# Patient Record
Sex: MALE | Race: WHITE | NOT HISPANIC OR LATINO | Employment: OTHER
[De-identification: names, ages, dates, MRNs, and addresses within clinical notes are randomized per-mention and may not be internally consistent; named-entity substitution may affect disease eponyms.]

---

## 2020-05-04 DIAGNOSIS — Z79.899 ENCOUNTER FOR LONG-TERM (CURRENT) USE OF OTHER MEDICATIONS: Primary | ICD-10-CM

## 2020-05-04 DIAGNOSIS — T38.0X5A IMMUNOSUPPRESSION DUE TO CHRONIC STEROID USE (H): ICD-10-CM

## 2020-05-04 DIAGNOSIS — D84.821 IMMUNOSUPPRESSION DUE TO CHRONIC STEROID USE (H): ICD-10-CM

## 2020-05-04 DIAGNOSIS — Z79.52 IMMUNOSUPPRESSION DUE TO CHRONIC STEROID USE (H): ICD-10-CM

## 2020-05-04 DIAGNOSIS — Z79.52 LONG TERM CURRENT USE OF SYSTEMIC STEROIDS: ICD-10-CM

## 2020-05-04 DIAGNOSIS — Z94.0 KIDNEY TRANSPLANTED: ICD-10-CM

## 2020-05-05 DIAGNOSIS — Z79.899 ENCOUNTER FOR LONG-TERM (CURRENT) USE OF OTHER MEDICATIONS: ICD-10-CM

## 2020-05-05 DIAGNOSIS — D84.821 IMMUNOSUPPRESSION DUE TO CHRONIC STEROID USE (H): ICD-10-CM

## 2020-05-05 DIAGNOSIS — Z79.52 IMMUNOSUPPRESSION DUE TO CHRONIC STEROID USE (H): ICD-10-CM

## 2020-05-05 DIAGNOSIS — Z94.0 KIDNEY TRANSPLANTED: ICD-10-CM

## 2020-05-05 DIAGNOSIS — T38.0X5A IMMUNOSUPPRESSION DUE TO CHRONIC STEROID USE (H): ICD-10-CM

## 2020-05-05 LAB
ANION GAP SERPL CALCULATED.3IONS-SCNC: 7 MMOL/L (ref 3–14)
BASOPHILS # BLD AUTO: 0 10E9/L (ref 0–0.2)
BASOPHILS NFR BLD AUTO: 0.2 %
BUN SERPL-MCNC: 24 MG/DL (ref 7–30)
CALCIUM SERPL-MCNC: 9.1 MG/DL (ref 8.5–10.1)
CHLORIDE SERPL-SCNC: 104 MMOL/L (ref 94–109)
CO2 SERPL-SCNC: 25 MMOL/L (ref 20–32)
CREAT SERPL-MCNC: 1.59 MG/DL (ref 0.66–1.25)
DIFFERENTIAL METHOD BLD: NORMAL
EOSINOPHIL # BLD AUTO: 0.1 10E9/L (ref 0–0.7)
EOSINOPHIL NFR BLD AUTO: 1.6 %
ERYTHROCYTE [DISTWIDTH] IN BLOOD BY AUTOMATED COUNT: 14 % (ref 10–15)
GFR SERPL CREATININE-BSD FRML MDRD: 46 ML/MIN/{1.73_M2}
GLUCOSE SERPL-MCNC: 112 MG/DL (ref 70–99)
HCT VFR BLD AUTO: 42.1 % (ref 40–53)
HGB BLD-MCNC: 13.5 G/DL (ref 13.3–17.7)
LYMPHOCYTES # BLD AUTO: 2.1 10E9/L (ref 0.8–5.3)
LYMPHOCYTES NFR BLD AUTO: 24.9 %
MCH RBC QN AUTO: 29.7 PG (ref 26.5–33)
MCHC RBC AUTO-ENTMCNC: 32.1 G/DL (ref 31.5–36.5)
MCV RBC AUTO: 93 FL (ref 78–100)
MONOCYTES # BLD AUTO: 1 10E9/L (ref 0–1.3)
MONOCYTES NFR BLD AUTO: 12.3 %
NEUTROPHILS # BLD AUTO: 5 10E9/L (ref 1.6–8.3)
NEUTROPHILS NFR BLD AUTO: 61 %
PLATELET # BLD AUTO: 195 10E9/L (ref 150–450)
POTASSIUM SERPL-SCNC: 4.6 MMOL/L (ref 3.4–5.3)
RBC # BLD AUTO: 4.55 10E12/L (ref 4.4–5.9)
SODIUM SERPL-SCNC: 136 MMOL/L (ref 133–144)
WBC # BLD AUTO: 8.3 10E9/L (ref 4–11)

## 2020-05-05 PROCEDURE — 85025 COMPLETE CBC W/AUTO DIFF WBC: CPT | Performed by: INTERNAL MEDICINE

## 2020-05-05 PROCEDURE — 36415 COLL VENOUS BLD VENIPUNCTURE: CPT | Performed by: INTERNAL MEDICINE

## 2020-05-05 PROCEDURE — 80048 BASIC METABOLIC PNL TOTAL CA: CPT | Performed by: INTERNAL MEDICINE

## 2020-06-19 DIAGNOSIS — T38.0X5A IMMUNOSUPPRESSION DUE TO CHRONIC STEROID USE (H): ICD-10-CM

## 2020-06-19 DIAGNOSIS — D84.821 IMMUNOSUPPRESSION DUE TO CHRONIC STEROID USE (H): ICD-10-CM

## 2020-06-19 DIAGNOSIS — Z79.899 ENCOUNTER FOR LONG-TERM (CURRENT) USE OF OTHER MEDICATIONS: ICD-10-CM

## 2020-06-19 DIAGNOSIS — Z94.0 KIDNEY TRANSPLANTED: ICD-10-CM

## 2020-06-19 DIAGNOSIS — Z79.52 IMMUNOSUPPRESSION DUE TO CHRONIC STEROID USE (H): ICD-10-CM

## 2020-06-19 LAB
ANION GAP SERPL CALCULATED.3IONS-SCNC: 7 MMOL/L (ref 3–14)
BASOPHILS # BLD AUTO: 0 10E9/L (ref 0–0.2)
BASOPHILS NFR BLD AUTO: 0.3 %
BUN SERPL-MCNC: 30 MG/DL (ref 7–30)
CALCIUM SERPL-MCNC: 8.8 MG/DL (ref 8.5–10.1)
CHLORIDE SERPL-SCNC: 107 MMOL/L (ref 94–109)
CO2 SERPL-SCNC: 22 MMOL/L (ref 20–32)
CREAT SERPL-MCNC: 1.85 MG/DL (ref 0.66–1.25)
DIFFERENTIAL METHOD BLD: ABNORMAL
EOSINOPHIL # BLD AUTO: 0.1 10E9/L (ref 0–0.7)
EOSINOPHIL NFR BLD AUTO: 1.2 %
ERYTHROCYTE [DISTWIDTH] IN BLOOD BY AUTOMATED COUNT: 13.6 % (ref 10–15)
GFR SERPL CREATININE-BSD FRML MDRD: 38 ML/MIN/{1.73_M2}
GLUCOSE SERPL-MCNC: 112 MG/DL (ref 70–99)
HCT VFR BLD AUTO: 41.8 % (ref 40–53)
HGB BLD-MCNC: 13.2 G/DL (ref 13.3–17.7)
LYMPHOCYTES # BLD AUTO: 1.4 10E9/L (ref 0.8–5.3)
LYMPHOCYTES NFR BLD AUTO: 19.2 %
MCH RBC QN AUTO: 30.1 PG (ref 26.5–33)
MCHC RBC AUTO-ENTMCNC: 31.6 G/DL (ref 31.5–36.5)
MCV RBC AUTO: 95 FL (ref 78–100)
MONOCYTES # BLD AUTO: 0.7 10E9/L (ref 0–1.3)
MONOCYTES NFR BLD AUTO: 9.9 %
NEUTROPHILS # BLD AUTO: 5.1 10E9/L (ref 1.6–8.3)
NEUTROPHILS NFR BLD AUTO: 69.4 %
PLATELET # BLD AUTO: 184 10E9/L (ref 150–450)
POTASSIUM SERPL-SCNC: 5.1 MMOL/L (ref 3.4–5.3)
RBC # BLD AUTO: 4.39 10E12/L (ref 4.4–5.9)
SODIUM SERPL-SCNC: 136 MMOL/L (ref 133–144)
WBC # BLD AUTO: 7.3 10E9/L (ref 4–11)

## 2020-06-19 PROCEDURE — 36415 COLL VENOUS BLD VENIPUNCTURE: CPT | Performed by: INTERNAL MEDICINE

## 2020-06-19 PROCEDURE — 80197 ASSAY OF TACROLIMUS: CPT | Performed by: INTERNAL MEDICINE

## 2020-06-19 PROCEDURE — 85025 COMPLETE CBC W/AUTO DIFF WBC: CPT | Performed by: INTERNAL MEDICINE

## 2020-06-19 PROCEDURE — 80048 BASIC METABOLIC PNL TOTAL CA: CPT | Performed by: INTERNAL MEDICINE

## 2020-06-20 LAB
TACROLIMUS BLD-MCNC: 3.5 UG/L (ref 5–15)
TME LAST DOSE: 0 H

## 2020-08-06 DIAGNOSIS — Z79.899 ENCOUNTER FOR LONG-TERM (CURRENT) USE OF OTHER MEDICATIONS: ICD-10-CM

## 2020-08-06 DIAGNOSIS — T38.0X5A IMMUNOSUPPRESSION DUE TO CHRONIC STEROID USE (H): ICD-10-CM

## 2020-08-06 DIAGNOSIS — Z94.0 KIDNEY TRANSPLANTED: ICD-10-CM

## 2020-08-06 DIAGNOSIS — D84.821 IMMUNOSUPPRESSION DUE TO CHRONIC STEROID USE (H): ICD-10-CM

## 2020-08-06 DIAGNOSIS — Z79.52 IMMUNOSUPPRESSION DUE TO CHRONIC STEROID USE (H): ICD-10-CM

## 2020-08-06 LAB
ANION GAP SERPL CALCULATED.3IONS-SCNC: 5 MMOL/L (ref 3–14)
BASOPHILS # BLD AUTO: 0 10E9/L (ref 0–0.2)
BASOPHILS NFR BLD AUTO: 0.1 %
BUN SERPL-MCNC: 30 MG/DL (ref 7–30)
CALCIUM SERPL-MCNC: 9 MG/DL (ref 8.5–10.1)
CHLORIDE SERPL-SCNC: 108 MMOL/L (ref 94–109)
CO2 SERPL-SCNC: 24 MMOL/L (ref 20–32)
CREAT SERPL-MCNC: 1.63 MG/DL (ref 0.66–1.25)
DIFFERENTIAL METHOD BLD: ABNORMAL
EOSINOPHIL # BLD AUTO: 0.1 10E9/L (ref 0–0.7)
EOSINOPHIL NFR BLD AUTO: 0.6 %
ERYTHROCYTE [DISTWIDTH] IN BLOOD BY AUTOMATED COUNT: 13.8 % (ref 10–15)
GFR SERPL CREATININE-BSD FRML MDRD: 44 ML/MIN/{1.73_M2}
GLUCOSE SERPL-MCNC: 104 MG/DL (ref 70–99)
HCT VFR BLD AUTO: 38.6 % (ref 40–53)
HGB BLD-MCNC: 12.2 G/DL (ref 13.3–17.7)
LYMPHOCYTES # BLD AUTO: 1.2 10E9/L (ref 0.8–5.3)
LYMPHOCYTES NFR BLD AUTO: 14.9 %
MCH RBC QN AUTO: 29.3 PG (ref 26.5–33)
MCHC RBC AUTO-ENTMCNC: 31.6 G/DL (ref 31.5–36.5)
MCV RBC AUTO: 93 FL (ref 78–100)
MONOCYTES # BLD AUTO: 0.8 10E9/L (ref 0–1.3)
MONOCYTES NFR BLD AUTO: 10 %
NEUTROPHILS # BLD AUTO: 6.1 10E9/L (ref 1.6–8.3)
NEUTROPHILS NFR BLD AUTO: 74.4 %
PLATELET # BLD AUTO: 196 10E9/L (ref 150–450)
POTASSIUM SERPL-SCNC: 5 MMOL/L (ref 3.4–5.3)
RBC # BLD AUTO: 4.16 10E12/L (ref 4.4–5.9)
SODIUM SERPL-SCNC: 137 MMOL/L (ref 133–144)
WBC # BLD AUTO: 8.2 10E9/L (ref 4–11)

## 2020-08-06 PROCEDURE — 85025 COMPLETE CBC W/AUTO DIFF WBC: CPT | Performed by: INTERNAL MEDICINE

## 2020-08-06 PROCEDURE — 80048 BASIC METABOLIC PNL TOTAL CA: CPT | Performed by: INTERNAL MEDICINE

## 2020-08-06 PROCEDURE — 36415 COLL VENOUS BLD VENIPUNCTURE: CPT | Performed by: INTERNAL MEDICINE

## 2020-11-16 ENCOUNTER — HEALTH MAINTENANCE LETTER (OUTPATIENT)
Age: 60
End: 2020-11-16

## 2021-09-18 ENCOUNTER — HEALTH MAINTENANCE LETTER (OUTPATIENT)
Age: 61
End: 2021-09-18

## 2021-09-24 ENCOUNTER — APPOINTMENT (OUTPATIENT)
Dept: URBAN - METROPOLITAN AREA CLINIC 252 | Age: 61
Setting detail: DERMATOLOGY
End: 2021-09-24

## 2021-09-24 VITALS — RESPIRATION RATE: 16 BRPM | HEIGHT: 70 IN | WEIGHT: 272 LBS

## 2021-09-24 DIAGNOSIS — D22 MELANOCYTIC NEVI: ICD-10-CM

## 2021-09-24 DIAGNOSIS — L57.0 ACTINIC KERATOSIS: ICD-10-CM

## 2021-09-24 DIAGNOSIS — L82.1 OTHER SEBORRHEIC KERATOSIS: ICD-10-CM

## 2021-09-24 DIAGNOSIS — L82.0 INFLAMED SEBORRHEIC KERATOSIS: ICD-10-CM

## 2021-09-24 DIAGNOSIS — Z71.89 OTHER SPECIFIED COUNSELING: ICD-10-CM

## 2021-09-24 PROBLEM — D22.5 MELANOCYTIC NEVI OF TRUNK: Status: ACTIVE | Noted: 2021-09-24

## 2021-09-24 PROBLEM — D22.62 MELANOCYTIC NEVI OF LEFT UPPER LIMB, INCLUDING SHOULDER: Status: ACTIVE | Noted: 2021-09-24

## 2021-09-24 PROBLEM — D22.61 MELANOCYTIC NEVI OF RIGHT UPPER LIMB, INCLUDING SHOULDER: Status: ACTIVE | Noted: 2021-09-24

## 2021-09-24 PROCEDURE — OTHER COUNSELING: OTHER

## 2021-09-24 PROCEDURE — 17110 DESTRUCT B9 LESION 1-14: CPT

## 2021-09-24 PROCEDURE — 99203 OFFICE O/P NEW LOW 30 MIN: CPT | Mod: 25

## 2021-09-24 PROCEDURE — OTHER LIQUID NITROGEN: OTHER

## 2021-09-24 PROCEDURE — 17003 DESTRUCT PREMALG LES 2-14: CPT | Mod: 59

## 2021-09-24 PROCEDURE — 17000 DESTRUCT PREMALG LESION: CPT | Mod: 59

## 2021-09-24 ASSESSMENT — LOCATION DETAILED DESCRIPTION DERM
LOCATION DETAILED: LEFT MEDIAL BUCCAL CHEEK
LOCATION DETAILED: NASAL SUPRATIP
LOCATION DETAILED: LEFT CENTRAL BUCCAL CHEEK
LOCATION DETAILED: RIGHT DISTAL POSTERIOR UPPER ARM
LOCATION DETAILED: RIGHT SUPERIOR MEDIAL FOREHEAD
LOCATION DETAILED: RIGHT ANTERIOR PROXIMAL UPPER ARM
LOCATION DETAILED: LEFT SUPERIOR CENTRAL BUCCAL CHEEK
LOCATION DETAILED: RIGHT LATERAL FOREHEAD
LOCATION DETAILED: LEFT DISTAL POSTERIOR UPPER ARM
LOCATION DETAILED: LEFT ANTERIOR SHOULDER
LOCATION DETAILED: LEFT INFERIOR CENTRAL MALAR CHEEK
LOCATION DETAILED: RIGHT POSTERIOR SHOULDER
LOCATION DETAILED: LEFT SUPERIOR UPPER BACK
LOCATION DETAILED: LEFT SUPERIOR OCCIPITAL SCALP

## 2021-09-24 ASSESSMENT — LOCATION SIMPLE DESCRIPTION DERM
LOCATION SIMPLE: LEFT UPPER ARM
LOCATION SIMPLE: RIGHT SHOULDER
LOCATION SIMPLE: RIGHT FOREHEAD
LOCATION SIMPLE: RIGHT UPPER ARM
LOCATION SIMPLE: NOSE
LOCATION SIMPLE: LEFT CHEEK
LOCATION SIMPLE: LEFT OCCIPITAL SCALP
LOCATION SIMPLE: LEFT SHOULDER
LOCATION SIMPLE: LEFT UPPER BACK

## 2021-09-24 ASSESSMENT — LOCATION ZONE DERM
LOCATION ZONE: TRUNK
LOCATION ZONE: SCALP
LOCATION ZONE: ARM
LOCATION ZONE: NOSE
LOCATION ZONE: FACE

## 2021-09-24 NOTE — HPI: FULL BODY SKIN EXAMINATION
What Is The Reason For Today's Visit?: Full Body Skin Examination
What Is The Reason For Today's Visit? (Being Monitored For X): concerning skin lesions on an annual basis
Additional History: Got renal transplant 13 years ago.

## 2021-09-24 NOTE — PROCEDURE: LIQUID NITROGEN
Render Post-Care Instructions In Note?: yes
Post-Care Instructions: - Patient was instructed to avoid picking at any of the treated lesions.
Consent: - Verbal and written consent was obtained, and risks were reviewed prior to procedure today. \\n- Risks discussed include but are not limited to pain, crusting, scabbing, blistering, scarring, temporary or permanent darker or lighter pigmentary change, recurrence, incomplete resolution, and infection.
Duration Of Freeze Thaw-Cycle (Seconds): 0
Medical Necessity Information: It is in your best interest to select a reason for this procedure from the list below. All of these items fulfill various CMS LCD requirements except the new and changing color options.
Detail Level: Detailed
Medical Necessity Clause: This procedure was medically necessary because the lesions that were treated were:
Post-Care Instructions: - Avoid picking at any of the treated lesions.\\n- Blisters should not be popped. However should a blister rupture, cover it with Vaseline ointment or Aquaphor and a bandage until healed.
Consent: - Discussed that these are a result of cumulative sun exposure.\\n- Verbal and written consent was obtained, and risks were reviewed prior to procedure today. \\n- Risks discussed include but are not limited to pain, crusting, scabbing, blistering, scarring, temporary or permanent darker or lighter pigmentary change, recurrence, incomplete resolution, and infection.
Add 52 Modifier (Optional): no

## 2021-09-24 NOTE — PROCEDURE: COUNSELING
Patient Specific Counseling (Will Not Stick From Patient To Patient): If not resolved in one month advised patient to return to clinic for a biopsy to rule out skin cancer on the nose
Detail Level: Zone
Detail Level: Generalized
Detail Level: Detailed
Detail Level: Simple

## 2022-01-08 ENCOUNTER — HEALTH MAINTENANCE LETTER (OUTPATIENT)
Age: 62
End: 2022-01-08

## 2022-11-19 ENCOUNTER — HEALTH MAINTENANCE LETTER (OUTPATIENT)
Age: 62
End: 2022-11-19

## 2023-04-13 ENCOUNTER — TRANSFERRED RECORDS (OUTPATIENT)
Dept: HEALTH INFORMATION MANAGEMENT | Facility: CLINIC | Age: 63
End: 2023-04-13
Payer: COMMERCIAL

## 2023-04-15 ENCOUNTER — HEALTH MAINTENANCE LETTER (OUTPATIENT)
Age: 63
End: 2023-04-15

## 2023-04-20 ENCOUNTER — ANCILLARY PROCEDURE (OUTPATIENT)
Dept: CT IMAGING | Facility: CLINIC | Age: 63
End: 2023-04-20
Payer: COMMERCIAL

## 2023-04-20 ENCOUNTER — PREP FOR PROCEDURE (OUTPATIENT)
Dept: OTOLARYNGOLOGY | Facility: CLINIC | Age: 63
End: 2023-04-20

## 2023-04-20 ENCOUNTER — OFFICE VISIT (OUTPATIENT)
Dept: OTOLARYNGOLOGY | Facility: CLINIC | Age: 63
End: 2023-04-20
Payer: COMMERCIAL

## 2023-04-20 VITALS — DIASTOLIC BLOOD PRESSURE: 96 MMHG | SYSTOLIC BLOOD PRESSURE: 180 MMHG | HEART RATE: 54 BPM

## 2023-04-20 DIAGNOSIS — Z98.890 MOHS DEFECT OF LEFT NOSE: ICD-10-CM

## 2023-04-20 DIAGNOSIS — Z98.890 MOHS DEFECT OF LEFT NOSE: Primary | ICD-10-CM

## 2023-04-20 DIAGNOSIS — M95.0 MOHS DEFECT OF LEFT NOSE: Primary | ICD-10-CM

## 2023-04-20 DIAGNOSIS — M95.0 MOHS DEFECT OF LEFT NOSE: ICD-10-CM

## 2023-04-20 PROCEDURE — 99204 OFFICE O/P NEW MOD 45 MIN: CPT | Performed by: OTOLARYNGOLOGY

## 2023-04-20 PROCEDURE — 70486 CT MAXILLOFACIAL W/O DYE: CPT | Performed by: RADIOLOGY

## 2023-04-20 PROCEDURE — 76377 3D RENDER W/INTRP POSTPROCES: CPT | Performed by: RADIOLOGY

## 2023-04-20 RX ORDER — PREDNISONE 5 MG/1
TABLET ORAL
COMMUNITY
Start: 2023-04-14

## 2023-04-20 RX ORDER — MYCOPHENOLATE MOFETIL 500 MG/1
TABLET ORAL
COMMUNITY
Start: 2023-04-14

## 2023-04-20 RX ORDER — SODIUM ZIRCONIUM CYCLOSILICATE 10 G/10G
5 POWDER, FOR SUSPENSION ORAL DAILY
COMMUNITY
Start: 2023-04-14

## 2023-04-20 RX ORDER — TACROLIMUS 0.5 MG/1
CAPSULE ORAL
COMMUNITY
Start: 2023-04-14

## 2023-04-20 RX ORDER — SODIUM BICARBONATE 325 MG/1
TABLET ORAL 3 TIMES DAILY
COMMUNITY

## 2023-04-20 RX ORDER — LISINOPRIL 20 MG/1
TABLET ORAL
COMMUNITY
Start: 2023-04-14

## 2023-04-20 RX ORDER — METOPROLOL TARTRATE 25 MG/1
TABLET, FILM COATED ORAL
COMMUNITY
Start: 2023-04-14

## 2023-04-20 RX ORDER — PRAVASTATIN SODIUM 40 MG
TABLET ORAL
COMMUNITY
Start: 2023-04-14

## 2023-04-20 RX ORDER — ALLOPURINOL 100 MG/1
TABLET ORAL
COMMUNITY
Start: 2023-04-14

## 2023-04-20 RX ORDER — VITAMIN B COMPLEX
TABLET ORAL DAILY
COMMUNITY

## 2023-04-20 RX ORDER — ASPIRIN 81 MG/1
81 TABLET ORAL DAILY
COMMUNITY
End: 2023-09-14

## 2023-04-20 RX ORDER — HYDROCHLOROTHIAZIDE 12.5 MG/1
TABLET ORAL
COMMUNITY
Start: 2023-04-14

## 2023-04-20 NOTE — Clinical Note
Please send letter in progress note section  to referring physician and PCP with appropriate letterhead.  Rosendo Garcia MD, PhD [facility] [address], [address] [place] [address]

## 2023-04-20 NOTE — LETTER
2023         RE: Kel Hernandez  [address]  [place] [address]        Dear Colleague,    Thank you for referring your patient, Kel Hernandez, to the [facility]. Please see a copy of my visit note below.    Facial Plastic and Reconstructive Surgery Consultation  Department of Otolaryngology  [facility]    Kel Hernandez  : 1960   MRN: [id number]    Rosendo Garcia MD, PhD  [facility]  [address], [address]  [place] [address]      Dear Doctor Radha,    Thank you for asking me to see your patient, Mr. Kel Hernandez, in consultation to evaluate his wound.  Today I had the pleasure of seeing him at the Facial Plastic and Reconstructive Surgery Clinic in the Department of Otolaryngology at [facility].    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 Garcia.   Medical Decision Making (MDM) (acute complicated injury because of the full-thickness component of his wound): The reconstructive options of healing by secondary intention, primary closure, skin grafting, and local flap reconstruction were described in detail.  We discussed how the patient has a higher risk of infections because of his immunosuppression and higher risk of nasal obstruction from intranasal scarring from his full thickness wound. After carefully considering the options, patient elects to undergo multistage multilayer reconstruction with a local flap which will eventually allow insertion of cartilage and stabilization of the nostril edge and external valve. We discussed the options of a melolabial or a forehead flap. He wanted to avoid the forehead flap because it will temporarily impair his ability to wear glasses. He understands that some amount of hair transfer to his nose could occur with a cheek flap.  Unfortunately his mucosal wound is so large, especially in the superior-inferior dimension, that it makes a bipedicled mucosal flap inappropriate because it will abnormally elevate his remaining nostril edge and a septal flap is a poor choice in an immunosuppressed patient where delayed healing of the donor site could result in a larger septal wound or a septal perforation. Because of this, I recommend skin grafting of the mucosal defect and placement of the cheek flap externally with cartilage inserted at the second stage of surgery. The downside of this plan is that it requires at least one additional stage of surgery and keeping the pedicle in place during stages 1 and 2. His full surgical plan is outlined in the care checklist. He did not find the aesthetic result from healing by secondary intention, nor the time needed to fully heal a wound secondarily to be acceptable. We will initiate surgery scheduling. I look forward to seeing Mr. Hernandez on his procedure day.  Care Checklist:  _Schedule for stage 1 reconstruction with wound preparation, melolabial flap, intranasal skin grafting where the donor site is from the neck or behind either ear.   _Anticipate the need for additional stages of surgery to complete the reconstructive process to include: Stage 2- elevation of the flap, placement of a conchal cartilage alar batten graft from either ear. Stage 3- pedicle division and inset. Stage 4- supra alar crease reconstruction because the thickness of the flap is expected to obliterate this facial landmark. And any additional procedures to address flap thickness or scar widening.   _Wound care to keep the wound constantly moist with ointment to promote healing.    2.) Left nasal obstruction from the following anatomic causes: loss of nasal support framework after cancer resection  Medical Decision Making (acute complicated injury): The skin cancer involved deep structures of the nose and resection of those structures  has destabilized the nasal support framework resulting in left dynamic nasal valve collapse and resulting nasal obstruction. The additional nasal surgery outlined in the care checklist is medically necessary to address the nasal obstruction resulting from resection of the skin cancer.  Given the size of the intranasal wound, he is at significant risk for scar and skin graft contracture. Nasal stenting is medically necessary to counteract the contractile forces of his scar and skin graft that will reconstruct his mucosal lining. He also has a longer period of intranasal scarring risk resulting in nasal obstruction because cartilage cannot be inserted before his skin graft becomes established. We will explore options for nasal stenting to prevent this outcome.   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   _Nasal stenting to prevent restenosis. Will explore various options for stenting both short and long-term.   _Obtain CT face with particular attention to the soft tissues to help characterize his affected and normal nasal cavities and to assist with stent planning.     3.) Comorbidities that increase the risk of surgery: Kidney transplant 15 years ago with immunosuppression, bilateral hip replacement, no NSAIDS because of kidney transplant. Aspirin.   MDM: (stable chronic problem or illness).   We discussed the patients comorbidities listed above increase the risks associated with any procedure and recommendations to mitigate those risks are listed in the care checklist below.   Care Checklist:  _No NSAIDs for postoperative pain because of kidney transplant.   _Anticoagulation plan with nephrologist whether he can stop ASA prior to surgery and how long he can remain off postop.     Background/Connection:   Preferred name = oJse D  What is most  important to know about you as a person? (No medical information) Retired. . Planning on [place] time in [place] and [place].     Photographs: [facility] consents signed 4/20/23.    It has been a pleasure to participate in the care of Mr. Hernandez. Thank you for this kind referral.     Sincerely,    Ruiz Scott MD  Facial Plastic and Reconstructive Surgeon  Department of Otolaryngology  [facility]                    ____________________________________________________  MA/RN Documentation Section    MOHS or CANCER REMOVAL DOCUMENTATION  Date of Mohs surgery or skin cancer removal: 4-20-23.  Cancer type: squamous cell carcinoma.  Margins: tumor free margins were obtained,  0 (No pain): How would you rate your pain since your surgery?   No: Have you had any significant bleeding since your surgery?   No: Have you had any significant discharge since your surgery?   No: Have you had any fevers since your surgery?     No: Do you currently smoke?   No: Have you had previous facial reconstruction?   No: Have you had previous skin cancers?  No: Have you had radiation to your head or neck in the past?    What was at the cancer site before the removal? Bubble on nose like a jelly bean  How long had you noticed the lesion or growth prior to having the removal? 3 month(s)  Did that lesion grow? Yes. If yes: Fast  Have you had a lot of sun exposure in the past? Yes  Do you remember blistering sunburns anywhere on your body? No  Have you used a tanning bed in the past? No  Have you smoked in the past?No    For NASAL wounds or wounds near the NOSE:   No: Did you have troubles breathing through your nose before Mohs surgery or cancer removal?   No: Any problems breathing through your nose after Mohs surgery or cancer removal?      SURGICAL RISK FACTORS SCREENING  Do you currently have or have you ever had in the past:  No: Complications with sedation or anesthesia.  Yes : Use blood thinners. Yes:  ASA.    No: Any heart problems.   No: Chest pain.   No: A pacemaker.  No: Problems with excessive bleeding or a bleeding disorder.  No: Problems with blood clots or a clotting disorder.   No: Sleep apnea or sleep with a CPAP machine.     No: Excessive scarring or keloid scars.   No: Face weakness.   No: Difficulty urinating.   No: Anxiety.   No: Depression.   No: Family history of excessive bleeding or a bleeding disorder.    No: Family history of blood clots or a clotting disorder.      PAST MEDICAL HISTORY: HTN.     PAST SURGICAL HISTORY: Kidney transplant 15 years ago. Bilateral hip replacement.   SOCIAL HISTORY:   No smoking.    ALLERGIES: Nsaids    MEDICATIONS:   Current Outpatient Medications   Medication Sig Dispense Refill     allopurinol (ZYLOPRIM) 100 MG tablet        aspirin 81 MG EC tablet Take 81 mg by mouth daily       hydrochlorothiazide (HYDRODIURIL) 12.5 MG tablet        lisinopril (ZESTRIL) 20 MG tablet        LOKELMA 10 g PACK packet        metoprolol tartrate (LOPRESSOR) 25 MG tablet        mycophenolate (GENERIC EQUIVALENT) 500 MG tablet        pravastatin (PRAVACHOL) 40 MG tablet        predniSONE (DELTASONE) 5 MG tablet        sodium bicarbonate 325 MG tablet Take by mouth 4 times daily       tacrolimus (GENERIC EQUIVALENT) 0.5 MG capsule        Vitamin D3 (VITAMIN D, CHOLECALCIFEROL,) 25 mcg (1000 units) tablet Take by mouth daily         Patient Care Staff Signature: Erika Pugh CMA      ______________________________________________  Dr. Scott Documentation Section      The information taken by the patient care staff was reviewed by me: Ruiz Scott MD     PHYSICAL EXAMINATION  CONSTITUTIONAL:  No apparent distress.  Pleasant affect.  Normal ability to communicate.  CRANIOFACIAL:  Normocephalic, atraumatic.    SKIN:  50d69ol cutaneous defect with a 12x8mm mucosal full-thickness wound.   Subunits involved: left nasal ala and inferior nasal sidewall.   Nearby landmarks: 2mm from the  nostril margin.   Depth: full-thickness with cartilage removed. .  Surrounding skin is viable. No bleeding.    Had some scaling and thick skin of the central and right nasal tip.    Left cheek bandage was partially taken down revealing a vertical incision that was sutured and c/d/i.     EARS:  Normal auricles.   NOSE: Complete loss of left external nasal valve structure from the wound.  Septal deviation present.  No polyps or purulence.  ORAL CAVITY AND OROPHARYNX: no lesions on inspection.  NECK:  The parotid is soft, without masses.  Supple laryngeal landmarks.  LYMPHATIC:  No palpable lymphadenopathy.  NEUROLOGIC:  Facial nerve intact.  RESPIRATORY:  Normal respiratory effort.  No stridor.  Voice strong.      PREOPERATIVE COUNSELING: An extensive preoperative discussion was held. The patient stated he understood the risks, benefits, alternatives and limitations of the procedure. The risks including but not limited to bleeding, infection, damage to surrounding structures, numbness, weakness, cancer recurrence, chronic pain, poor aesthetic result, partial or total skin loss, distortion of surrounding facial structures, and unforeseen complications related to surgery or anesthesia were described. I emphasized the risks of partial or total skin loss at the surgical sites. He also understands the possibility of distortion of surrounding facial structures including his nostril margin which may result in alar retraction or nasal congestion. I discussed the limitations of this procedure in that the donor site will have anticipated scars and complications can occur at the donor site.  He understands that more skin may need to be excised during the reconstruction. We discussed how additional surgeries may be needed to obtain an optimal result.    I described how no reconstructive effort will be able to restore him to his exact precancer condition. We also acknowledged that facial asymmetries will be present after the  reconstruction. The patient stated he had his questions answered to his satisfaction.        Again, thank you for allowing me to participate in the care of your patient.        Sincerely,        Ruiz Scott MD

## 2023-04-20 NOTE — NURSING NOTE
Kel Hernandez's chief complaint for this visit includes:  Chief Complaint   Patient presents with     Consult     Refer Endrizzi Mohs nasal ala  4-20-23     PCP:     Referring Provider:  No referring provider defined for this encounter.    BP (!) 180/96   Pulse 54

## 2023-04-20 NOTE — PROGRESS NOTES
Facial Plastic and Reconstructive Surgery Consultation  Department of Otolaryngology  [facility]    Kel Hernandez  : 1960   MRN: [id number]    Rosendo Garcia MD, PhD  [facility]  [address], [address]  [place] [address]      Dear Doctor Radha,    Thank you for asking me to see your patient, Mr. Kel Hernandez, in consultation to evaluate his wound.  Today I had the pleasure of seeing him at the Facial Plastic and Reconstructive Surgery Clinic in the Department of Otolaryngology at [facility].    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 Garcia.   Medical Decision Making (MDM) (acute complicated injury because of the full-thickness component of his wound): The reconstructive options of healing by secondary intention, primary closure, skin grafting, and local flap reconstruction were described in detail.  We discussed how the patient has a higher risk of infections because of his immunosuppression and higher risk of nasal obstruction from intranasal scarring from his full thickness wound. After carefully considering the options, patient elects to undergo multistage multilayer reconstruction with a local flap which will eventually allow insertion of cartilage and stabilization of the nostril edge and external valve. We discussed the options of a melolabial or a forehead flap. He wanted to avoid the forehead flap because it will temporarily impair his ability to wear glasses. He understands that some amount of hair transfer to his nose could occur with a cheek flap. Unfortunately his mucosal wound is so large, especially in the superior-inferior dimension, that it makes a bipedicled mucosal flap inappropriate because it will abnormally elevate his remaining nostril edge and a septal flap is a poor choice in an  immunosuppressed patient where delayed healing of the donor site could result in a larger septal wound or a septal perforation. Because of this, I recommend skin grafting of the mucosal defect and placement of the cheek flap externally with cartilage inserted at the second stage of surgery. The downside of this plan is that it requires at least one additional stage of surgery and keeping the pedicle in place during stages 1 and 2. His full surgical plan is outlined in the care checklist. He did not find the aesthetic result from healing by secondary intention, nor the time needed to fully heal a wound secondarily to be acceptable. We will initiate surgery scheduling. I look forward to seeing Mr. Hernandez on his procedure day.  Care Checklist:  _Schedule for stage 1 reconstruction with wound preparation, melolabial flap, intranasal skin grafting where the donor site is from the neck or behind either ear.   _Anticipate the need for additional stages of surgery to complete the reconstructive process to include: Stage 2- elevation of the flap, placement of a conchal cartilage alar batten graft from either ear. Stage 3- pedicle division and inset. Stage 4- supra alar crease reconstruction because the thickness of the flap is expected to obliterate this facial landmark. And any additional procedures to address flap thickness or scar widening.   _Wound care to keep the wound constantly moist with ointment to promote healing.    2.) Left nasal obstruction from the following anatomic causes: loss of nasal support framework after cancer resection  Medical Decision Making (acute complicated injury): The skin cancer involved deep structures of the nose and resection of those structures has destabilized the nasal support framework resulting in left dynamic nasal valve collapse and resulting nasal obstruction. The additional nasal surgery outlined in the care checklist is medically necessary to address the nasal obstruction  resulting from resection of the skin cancer.  Given the size of the intranasal wound, he is at significant risk for scar and skin graft contracture. Nasal stenting is medically necessary to counteract the contractile forces of his scar and skin graft that will reconstruct his mucosal lining. He also has a longer period of intranasal scarring risk resulting in nasal obstruction because cartilage cannot be inserted before his skin graft becomes established. We will explore options for nasal stenting to prevent this outcome.   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   _Nasal stenting to prevent restenosis. Will explore various options for stenting both short and long-term.   _Obtain CT face with particular attention to the soft tissues to help characterize his affected and normal nasal cavities and to assist with stent planning.     3.) Comorbidities that increase the risk of surgery: Kidney transplant 15 years ago with immunosuppression, bilateral hip replacement, no NSAIDS because of kidney transplant. Aspirin.   MDM: (stable chronic problem or illness).   We discussed the patients comorbidities listed above increase the risks associated with any procedure and recommendations to mitigate those risks are listed in the care checklist below.   Care Checklist:  _No NSAIDs for postoperative pain because of kidney transplant.   _Anticoagulation plan with nephrologist whether he can stop ASA prior to surgery and how long he can remain off postop.     Background/Connection:   Preferred name = Jose D  What is most important to know about you as a person? (No medical information) Retired. . Planning on [place] time in [place] and [place].     Photographs: [facility] consents signed 4/20/23.    It has been a pleasure to participate in the  care of Mr. Hernandez. Thank you for this kind referral.     Sincerely,    Ruiz Scott MD  Facial Plastic and Reconstructive Surgeon  Department of Otolaryngology  [facility]                    ____________________________________________________  MA/RN Documentation Section    MOHS or CANCER REMOVAL DOCUMENTATION  Date of Mohs surgery or skin cancer removal: 4-20-23.  Cancer type: squamous cell carcinoma.  Margins: tumor free margins were obtained,  0 (No pain): How would you rate your pain since your surgery?   No: Have you had any significant bleeding since your surgery?   No: Have you had any significant discharge since your surgery?   No: Have you had any fevers since your surgery?     No: Do you currently smoke?   No: Have you had previous facial reconstruction?   No: Have you had previous skin cancers?  No: Have you had radiation to your head or neck in the past?    What was at the cancer site before the removal? Bubble on nose like a jelly bean  How long had you noticed the lesion or growth prior to having the removal? 3 month(s)  Did that lesion grow? Yes. If yes: Fast  Have you had a lot of sun exposure in the past? Yes  Do you remember blistering sunburns anywhere on your body? No  Have you used a tanning bed in the past? No  Have you smoked in the past?No    For NASAL wounds or wounds near the NOSE:   No: Did you have troubles breathing through your nose before Mohs surgery or cancer removal?   No: Any problems breathing through your nose after Mohs surgery or cancer removal?      SURGICAL RISK FACTORS SCREENING  Do you currently have or have you ever had in the past:  No: Complications with sedation or anesthesia.  Yes : Use blood thinners. Yes:  ASA.   No: Any heart problems.   No: Chest pain.   No: A pacemaker.  No: Problems with excessive bleeding or a bleeding disorder.  No: Problems with blood clots or a clotting disorder.   No: Sleep apnea or sleep with a CPAP machine.     No:  Excessive scarring or keloid scars.   No: Face weakness.   No: Difficulty urinating.   No: Anxiety.   No: Depression.   No: Family history of excessive bleeding or a bleeding disorder.    No: Family history of blood clots or a clotting disorder.      PAST MEDICAL HISTORY: HTN.     PAST SURGICAL HISTORY: Kidney transplant 15 years ago. Bilateral hip replacement.   SOCIAL HISTORY:   No smoking.    ALLERGIES: Nsaids    MEDICATIONS:   Current Outpatient Medications   Medication Sig Dispense Refill     allopurinol (ZYLOPRIM) 100 MG tablet        aspirin 81 MG EC tablet Take 81 mg by mouth daily       hydrochlorothiazide (HYDRODIURIL) 12.5 MG tablet        lisinopril (ZESTRIL) 20 MG tablet        LOKELMA 10 g PACK packet        metoprolol tartrate (LOPRESSOR) 25 MG tablet        mycophenolate (GENERIC EQUIVALENT) 500 MG tablet        pravastatin (PRAVACHOL) 40 MG tablet        predniSONE (DELTASONE) 5 MG tablet        sodium bicarbonate 325 MG tablet Take by mouth 4 times daily       tacrolimus (GENERIC EQUIVALENT) 0.5 MG capsule        Vitamin D3 (VITAMIN D, CHOLECALCIFEROL,) 25 mcg (1000 units) tablet Take by mouth daily         Patient Care Staff Signature: Erika Pugh, [facility]      ______________________________________________  Dr. Scott Documentation Section      The information taken by the patient care staff was reviewed by me: Ruiz Scott MD     PHYSICAL EXAMINATION  CONSTITUTIONAL:  No apparent distress.  Pleasant affect.  Normal ability to communicate.  CRANIOFACIAL:  Normocephalic, atraumatic.    SKIN:  36x49em cutaneous defect with a 12x8mm mucosal full-thickness wound.   Subunits involved: left nasal ala and inferior nasal sidewall.   Nearby landmarks: 2mm from the nostril margin.   Depth: full-thickness with cartilage removed. .  Surrounding skin is viable. No bleeding.    Had some scaling and thick skin of the central and right nasal tip.    Left cheek bandage was partially taken down revealing  a vertical incision that was sutured and c/d/i.     EARS:  Normal auricles.   NOSE: Complete loss of left external nasal valve structure from the wound.  Septal deviation present.  No polyps or purulence.  ORAL CAVITY AND OROPHARYNX: no lesions on inspection.  NECK:  The parotid is soft, without masses.  Supple laryngeal landmarks.  LYMPHATIC:  No palpable lymphadenopathy.  NEUROLOGIC:  Facial nerve intact.  RESPIRATORY:  Normal respiratory effort.  No stridor.  Voice strong.      PREOPERATIVE COUNSELING: An extensive preoperative discussion was held. The patient stated he understood the risks, benefits, alternatives and limitations of the procedure. The risks including but not limited to bleeding, infection, damage to surrounding structures, numbness, weakness, cancer recurrence, chronic pain, poor aesthetic result, partial or total skin loss, distortion of surrounding facial structures, and unforeseen complications related to surgery or anesthesia were described. I emphasized the risks of partial or total skin loss at the surgical sites. He also understands the possibility of distortion of surrounding facial structures including his nostril margin which may result in alar retraction or nasal congestion. I discussed the limitations of this procedure in that the donor site will have anticipated scars and complications can occur at the donor site.  He understands that more skin may need to be excised during the reconstruction. We discussed how additional surgeries may be needed to obtain an optimal result.    I described how no reconstructive effort will be able to restore him to his exact precancer condition. We also acknowledged that facial asymmetries will be present after the reconstruction. The patient stated he had his questions answered to his satisfaction.

## 2023-04-20 NOTE — PATIENT INSTRUCTIONS
Wound Care Instructions:   Please keep your facial wound covered at all times with ointment to keep the wound healthy. Dryness and crusting means the wound is unhealthy. You many need to apply ointment every 2 hours while you are awake to keep the wound constantly moist. If the wound is near your eyes, use Erythromycin Ophthalmic Ointment (ointment that is safe to get into the eyes). Otherwise, if it is away from your eyes, use Vaseline on the wound. Make sure the wound is always moist and covered with ointment.  You can choose to wear a dressing or bandage to camoflauge the wound, but a dressing or bandage is not necessary unless you work in a dirty or dawna environment. Covering the wound at all times with ointment to maintain moisture is necessary. If you wear a dressing or bandage, check under the dressing frequently to make sure the wound does not dry out.    You can shower and let the wound get wet in 48 hours. Do not scrub the wound. After your shower, gently pat the wound dry with a clean towel and apply more ointment.  Patients are often concerned about whether an open facial wound could get infected. It is very rare that an open wound gets a severe bacterial infection because bacteria can only sit on the surface of the wound and cannot penetrate into the deeper tissue leading to problems. Wounds commonly build up yellow or gray debris and appear infected even when they are not. This yellow or gray debris are waste products from the healing process combined with ointment. The best way to stop even the appearance of an infection is to keep your wound constantly moist and let the shower gently remove this debris once a day.    A more common type of infection is a minor fungal infection that results from keeping the wound moist with ointment. This is like diaper rash around your wound. Patients know that they have a fungal infection when they start experiencing severe itching, and discomfort around the  wound, and see discrete red patches away from the wound (called satellite lesions). If you suspect you have any type of infection, please call us.   Please contact our clinic if you have questions or if problems arise: [place] office: [phone]. If it is an urgent matter when the clinic is closed, please contact the [facility]  [phone] and ask to have Dr. Ruiz Scott, or the ENT resident on-call paged. If it is a serious matter requiring immediate attention, please call 911 or go to your nearest emergency department.   PAST MEDICAL HISTORY:  Asthma     Asthma     Atrophy of the muscles Right upper extremity    Cluster headache     Dizziness     Ovarian cyst     Rhinitis, chronic     Weakness of right side of body

## 2023-04-28 ENCOUNTER — OFFICE VISIT (OUTPATIENT)
Dept: FAMILY MEDICINE | Facility: CLINIC | Age: 63
End: 2023-04-28
Payer: COMMERCIAL

## 2023-04-28 VITALS
OXYGEN SATURATION: 98 % | RESPIRATION RATE: 18 BRPM | WEIGHT: 254 LBS | HEART RATE: 56 BPM | SYSTOLIC BLOOD PRESSURE: 148 MMHG | DIASTOLIC BLOOD PRESSURE: 84 MMHG | TEMPERATURE: 97.2 F

## 2023-04-28 DIAGNOSIS — S09.92XD INJURY OF NOSE, SUBSEQUENT ENCOUNTER: ICD-10-CM

## 2023-04-28 DIAGNOSIS — Z01.818 PREOPERATIVE EXAMINATION: Primary | ICD-10-CM

## 2023-04-28 PROCEDURE — 93000 ELECTROCARDIOGRAM COMPLETE: CPT | Performed by: NURSE PRACTITIONER

## 2023-04-28 PROCEDURE — 99204 OFFICE O/P NEW MOD 45 MIN: CPT | Performed by: NURSE PRACTITIONER

## 2023-04-28 ASSESSMENT — PAIN SCALES - GENERAL: PAINLEVEL: NO PAIN (0)

## 2023-04-28 NOTE — PROGRESS NOTES
[facility]  [address] [place] [id number]  Phone: [phone]  Fax: [phone]  Primary Provider:   Pre-op Performing Provider: LUZ MUHAMMAD      PREOPERATIVE EVALUATION:  Today's date: 4/28/2023    Kel Hernandez is a 62 year old male who presents for a preoperative evaluation.      4/28/2023    10:01 AM   Additional Questions   Roomed by [facility]     Surgical Information:  Surgery/Procedure: Stage 1 Left nasal reconstruction with wound preparation, local flaps, Full thickness skin graft from either neck or postauricular skin, complex closure, possible ear cartilage graft, possible placement of nasal stent  Surgery Location: [facility]  Surgeon: Ruiz Scott MD  Surgery Date: 5/4/23  Time of Surgery:   Where patient plans to recover: At home with family  Fax number for surgical facility: Note does not need to be faxed, will be available electronically in Epic.    Assessment & Plan     The proposed surgical procedure is considered LOW risk.    (Z01.818) Preoperative examination  (primary encounter diagnosis)  Comment:   Plan: EKG 12-lead complete w/read - Clinics            (S09.92XD) Injury of nose, subsequent encounter  Comment:   Plan:            Risks and Recommendations:  Patient is a post kidney transplant patient with a baseline creatinine of 2.65 with a GFR of 24 and a hemoglobin baseline of 10.4.  No further work-up in regards to these lab findings from the renal center.    The patient has the following additional risks and recommendations for perioperative complications:   - Consult Hospitalist / IM to assist with post-op medical management    Antiplatelet or Anticoagulation Medication Instructions:   - aspirin: Discontinue aspirin 7-10 days prior to procedure to reduce bleeding risk. It should be resumed postoperatively.     Additional Medication Instructions:   - ibuprofen (Advil, Motrin): HOLD 1 day before surgery.      RECOMMENDATION:  APPROVAL GIVEN to proceed with proposed procedure, without further diagnostic evaluation.    6}    Subjective     HPI related to upcoming procedure: Stage 1 Left nasal reconstruction with wound preparation, local flaps, Full thickness skin graft from either neck or postauricular skin, complex closure, possible ear cartilage graft, possible placement of nasal stent          4/27/2023     7:09 PM   Preop Questions   1. Have you ever had a heart attack or stroke? No   2. Have you ever had surgery on your heart or blood vessels, such as a stent placement, a coronary artery bypass, or surgery on an artery in your head, neck, heart, or legs? No   3. Do you have chest pain with activity? No   4. Do you have a history of  heart failure? No   5. Do you currently have a cold, bronchitis or symptoms of other infection? No   6. Do you have a cough, shortness of breath, or wheezing? No   7. Do you or anyone in your family have previous history of blood clots? No   8. Do you or does anyone in your family have a serious bleeding problem such as prolonged bleeding following surgeries or cuts? No   9. Have you ever had problems with anemia or been told to take iron pills? No   10. Have you had any abnormal blood loss such as black, tarry or bloody stools? No   11. Have you ever had a blood transfusion? No   12. Are you willing to have a blood transfusion if it is medically needed before, during, or after your surgery? Yes   13. Have you or any of your relatives ever had problems with anesthesia? No   14. Do you have sleep apnea, excessive snoring or daytime drowsiness? No   15. Do you have any artifical heart valves or other implanted medical devices like a pacemaker, defibrillator, or continuous glucose monitor? No   16. Do you have artificial joints? YES    17. Are you allergic to latex? No       Health Care Directive:  Patient does not have a Health Care Directive or Living Will: Discussed advance care  planning with patient; however, patient declined at this time.    Preoperative Review of :   reviewed - no record of controlled substances prescribed.        Status of Chronic Conditions:  See problem list for active medical problems.  Problems all longstanding and stable, except as noted/documented.  See ROS for pertinent symptoms related to these conditions.    ANEMIA - Patient has a recent history of moderate-severe anemia, related to kidney transplant with a chronic hemoglobin at 10.4 which is his baseline    RENAL INSUFFICIENCY - Patient has a longstanding history of moderate-severe chronic renal insufficiency. Last Cr 2.65 with GFR of 24 due to post renal transplant      Review of Systems  Constitutional, neuro, ENT, endocrine, pulmonary, cardiac, gastrointestinal, genitourinary, musculoskeletal, integument and psychiatric systems are negative, except as otherwise noted.    Patient Active Problem List    Diagnosis Date Noted     Mohs defect of left nose 04/20/2023     Priority: Medium     Added automatically from request for surgery [id number]        No past medical history on file.  Past Surgical History:   Procedure Laterality Date     ORTHOPEDIC SURGERY       TRANSPLANT       Current Outpatient Medications   Medication Sig Dispense Refill     allopurinol (ZYLOPRIM) 100 MG tablet        aspirin 81 MG EC tablet Take 81 mg by mouth daily       hydrochlorothiazide (HYDRODIURIL) 12.5 MG tablet        lisinopril (ZESTRIL) 20 MG tablet        LOKELMA 10 g PACK packet        metoprolol tartrate (LOPRESSOR) 25 MG tablet        mycophenolate (GENERIC EQUIVALENT) 500 MG tablet        pravastatin (PRAVACHOL) 40 MG tablet        predniSONE (DELTASONE) 5 MG tablet        sodium bicarbonate 325 MG tablet Take by mouth 4 times daily       tacrolimus (GENERIC EQUIVALENT) 0.5 MG capsule        Vitamin D3 (VITAMIN D, CHOLECALCIFEROL,) 25 mcg (1000 units) tablet Take by mouth daily         Allergies   Allergen Reactions      Nsaids      PT HAS HAD A KIDNEY TRANSPLANT        Social History     Tobacco Use     Smoking status: Never     Smokeless tobacco: Not on file   Vaping Use     Vaping status: Not on file   Substance Use Topics     Alcohol use: Yes     Comment: mild. 1/2 weeks     Family History   Problem Relation Age of Onset     Other Cancer Mother         Lung Cancer     History   Drug Use Unknown         Objective     BP (!) 148/84   Pulse 56   Temp 97.2  F (36.2  C) (Tympanic)   Resp 18   Wt 115.2 kg (254 lb)   SpO2 98%     Physical Exam    GENERAL APPEARANCE: healthy, alert and no distress     EYES: EOMI, PERRL     HENT: ear canals and TM's normal and nose and mouth without ulcers or lesions     NECK: no adenopathy, no asymmetry, masses, or scars and thyroid normal to palpation     RESP: lungs clear to auscultation - no rales, rhonchi or wheezes     CV: regular rates and rhythm, normal S1 S2, no S3 or S4 and no murmur, click or rub     ABDOMEN:  soft, nontender, no HSM or masses and bowel sounds normal     MS: extremities normal- no gross deformities noted, no evidence of inflammation in joints, FROM in all extremities.     SKIN: no suspicious lesions or rashes     NEURO: Normal strength and tone, sensory exam grossly normal, mentation intact and speech normal     PSYCH: mentation appears normal. and affect normal/bright     LYMPHATICS: No cervical adenopathy    No results for input(s): HGB, PLT, INR, NA, POTASSIUM, CR, A1C in the last 92681 hours.     Diagnostics:  Patient's labs completed at [facility] at his post renal transplant center on 4/25/2023.    CBC shows a hemoglobin of 10.7 which is his baseline  Sodium was 137 potassium was 4.5  Creatinine was at 2.65 which is his baseline with a GFR of 24 due to his post kidney transplant    EKG: sinus bradycardia, normal axis, normal intervals, no acute ST/T changes c/w ischemia, no LVH by voltage criteria, unchanged from previous tracings    Revised Cardiac Risk Index  (RCRI):  The patient has the following serious cardiovascular risks for perioperative complications:   - High risk surgery (>5% cardiac complication risk) = 1 point     RCRI Interpretation: 1 point: Class II (low risk - 0.9% complication rate)          Signed Electronically by: SANCHEZ Higgins CNP  Copy of this evaluation report is provided to requesting physician.

## 2023-04-28 NOTE — H&P (VIEW-ONLY)
[facility]  [address] [place] [id number]  Phone: [phone]  Fax: [phone]  Primary Provider:   Pre-op Performing Provider: LUZ MUHAMMAD      PREOPERATIVE EVALUATION:  Today's date: 4/28/2023    Kel Hernandez is a 62 year old male who presents for a preoperative evaluation.      4/28/2023    10:01 AM   Additional Questions   Roomed by [facility]     Surgical Information:  Surgery/Procedure: Stage 1 Left nasal reconstruction with wound preparation, local flaps, Full thickness skin graft from either neck or postauricular skin, complex closure, possible ear cartilage graft, possible placement of nasal stent  Surgery Location: [facility]  Surgeon: Ruiz Scott MD  Surgery Date: 5/4/23  Time of Surgery:   Where patient plans to recover: At home with family  Fax number for surgical facility: Note does not need to be faxed, will be available electronically in Epic.    Assessment & Plan     The proposed surgical procedure is considered LOW risk.    (Z01.818) Preoperative examination  (primary encounter diagnosis)  Comment:   Plan: EKG 12-lead complete w/read - Clinics            (S09.92XD) Injury of nose, subsequent encounter  Comment:   Plan:            Risks and Recommendations:  Patient is a post kidney transplant patient with a baseline creatinine of 2.65 with a GFR of 24 and a hemoglobin baseline of 10.4.  No further work-up in regards to these lab findings from the renal center.    The patient has the following additional risks and recommendations for perioperative complications:   - Consult Hospitalist / IM to assist with post-op medical management    Antiplatelet or Anticoagulation Medication Instructions:   - aspirin: Discontinue aspirin 7-10 days prior to procedure to reduce bleeding risk. It should be resumed postoperatively.     Additional Medication Instructions:   - ibuprofen (Advil, Motrin): HOLD 1 day before surgery.      RECOMMENDATION:  APPROVAL GIVEN to proceed with proposed procedure, without further diagnostic evaluation.    6}    Subjective     HPI related to upcoming procedure: Stage 1 Left nasal reconstruction with wound preparation, local flaps, Full thickness skin graft from either neck or postauricular skin, complex closure, possible ear cartilage graft, possible placement of nasal stent          4/27/2023     7:09 PM   Preop Questions   1. Have you ever had a heart attack or stroke? No   2. Have you ever had surgery on your heart or blood vessels, such as a stent placement, a coronary artery bypass, or surgery on an artery in your head, neck, heart, or legs? No   3. Do you have chest pain with activity? No   4. Do you have a history of  heart failure? No   5. Do you currently have a cold, bronchitis or symptoms of other infection? No   6. Do you have a cough, shortness of breath, or wheezing? No   7. Do you or anyone in your family have previous history of blood clots? No   8. Do you or does anyone in your family have a serious bleeding problem such as prolonged bleeding following surgeries or cuts? No   9. Have you ever had problems with anemia or been told to take iron pills? No   10. Have you had any abnormal blood loss such as black, tarry or bloody stools? No   11. Have you ever had a blood transfusion? No   12. Are you willing to have a blood transfusion if it is medically needed before, during, or after your surgery? Yes   13. Have you or any of your relatives ever had problems with anesthesia? No   14. Do you have sleep apnea, excessive snoring or daytime drowsiness? No   15. Do you have any artifical heart valves or other implanted medical devices like a pacemaker, defibrillator, or continuous glucose monitor? No   16. Do you have artificial joints? YES    17. Are you allergic to latex? No       Health Care Directive:  Patient does not have a Health Care Directive or Living Will: Discussed advance care  planning with patient; however, patient declined at this time.    Preoperative Review of :   reviewed - no record of controlled substances prescribed.        Status of Chronic Conditions:  See problem list for active medical problems.  Problems all longstanding and stable, except as noted/documented.  See ROS for pertinent symptoms related to these conditions.    ANEMIA - Patient has a recent history of moderate-severe anemia, related to kidney transplant with a chronic hemoglobin at 10.4 which is his baseline    RENAL INSUFFICIENCY - Patient has a longstanding history of moderate-severe chronic renal insufficiency. Last Cr 2.65 with GFR of 24 due to post renal transplant      Review of Systems  Constitutional, neuro, ENT, endocrine, pulmonary, cardiac, gastrointestinal, genitourinary, musculoskeletal, integument and psychiatric systems are negative, except as otherwise noted.    Patient Active Problem List    Diagnosis Date Noted     Mohs defect of left nose 04/20/2023     Priority: Medium     Added automatically from request for surgery [id number]        No past medical history on file.  Past Surgical History:   Procedure Laterality Date     ORTHOPEDIC SURGERY       TRANSPLANT       Current Outpatient Medications   Medication Sig Dispense Refill     allopurinol (ZYLOPRIM) 100 MG tablet        aspirin 81 MG EC tablet Take 81 mg by mouth daily       hydrochlorothiazide (HYDRODIURIL) 12.5 MG tablet        lisinopril (ZESTRIL) 20 MG tablet        LOKELMA 10 g PACK packet        metoprolol tartrate (LOPRESSOR) 25 MG tablet        mycophenolate (GENERIC EQUIVALENT) 500 MG tablet        pravastatin (PRAVACHOL) 40 MG tablet        predniSONE (DELTASONE) 5 MG tablet        sodium bicarbonate 325 MG tablet Take by mouth 4 times daily       tacrolimus (GENERIC EQUIVALENT) 0.5 MG capsule        Vitamin D3 (VITAMIN D, CHOLECALCIFEROL,) 25 mcg (1000 units) tablet Take by mouth daily         Allergies   Allergen Reactions      Nsaids      PT HAS HAD A KIDNEY TRANSPLANT        Social History     Tobacco Use     Smoking status: Never     Smokeless tobacco: Not on file   Vaping Use     Vaping status: Not on file   Substance Use Topics     Alcohol use: Yes     Comment: mild. 1/2 weeks     Family History   Problem Relation Age of Onset     Other Cancer Mother         Lung Cancer     History   Drug Use Unknown         Objective     BP (!) 148/84   Pulse 56   Temp 97.2  F (36.2  C) (Tympanic)   Resp 18   Wt 115.2 kg (254 lb)   SpO2 98%     Physical Exam    GENERAL APPEARANCE: healthy, alert and no distress     EYES: EOMI, PERRL     HENT: ear canals and TM's normal and nose and mouth without ulcers or lesions     NECK: no adenopathy, no asymmetry, masses, or scars and thyroid normal to palpation     RESP: lungs clear to auscultation - no rales, rhonchi or wheezes     CV: regular rates and rhythm, normal S1 S2, no S3 or S4 and no murmur, click or rub     ABDOMEN:  soft, nontender, no HSM or masses and bowel sounds normal     MS: extremities normal- no gross deformities noted, no evidence of inflammation in joints, FROM in all extremities.     SKIN: no suspicious lesions or rashes     NEURO: Normal strength and tone, sensory exam grossly normal, mentation intact and speech normal     PSYCH: mentation appears normal. and affect normal/bright     LYMPHATICS: No cervical adenopathy    No results for input(s): HGB, PLT, INR, NA, POTASSIUM, CR, A1C in the last [id number] hours.     Diagnostics:  Patient's labs completed at [facility] at his post renal transplant center on 4/25/2023.    CBC shows a hemoglobin of 10.7 which is his baseline  Sodium was 137 potassium was 4.5  Creatinine was at 2.65 which is his baseline with a GFR of 24 due to his post kidney transplant    EKG: sinus bradycardia, normal axis, normal intervals, no acute ST/T changes c/w ischemia, no LVH by voltage criteria, unchanged from previous tracings    Revised Cardiac Risk Index  (RCRI):  The patient has the following serious cardiovascular risks for perioperative complications:   - High risk surgery (>5% cardiac complication risk) = 1 point     RCRI Interpretation: 1 point: Class II (low risk - 0.9% complication rate)          Signed Electronically by: SANCHEZ Higgins CNP  Copy of this evaluation report is provided to requesting physician.

## 2023-05-02 ENCOUNTER — ANESTHESIA EVENT (OUTPATIENT)
Dept: SURGERY | Facility: AMBULATORY SURGERY CENTER | Age: 63
End: 2023-05-02
Payer: COMMERCIAL

## 2023-05-02 NOTE — ANESTHESIA PREPROCEDURE EVALUATION
Anesthesia Pre-Procedure Evaluation    Patient: Kel Hernandez   MRN: [id number] : 1960        Procedure : Procedure(s):  Stage 1 Left nasal reconstruction with wound preparation, local flaps, Full thickness skin graft from either neck or postauricular skin, complex closure, possible ear cartilage graft, possible placement of nasal stent.          Past Medical History:   Diagnosis Date     Cancer (H) Skin Cancer on nose     Hypertension 15years ago after transplant      Past Surgical History:   Procedure Laterality Date     COLONOSCOPY  2019     ORTHOPEDIC SURGERY       TRANSPLANT        Allergies   Allergen Reactions     Nsaids      PT HAS HAD A KIDNEY TRANSPLANT      Social History     Tobacco Use     Smoking status: Never     Smokeless tobacco: Never   Vaping Use     Vaping status: Never Used   Substance Use Topics     Alcohol use: Not Currently     Comment: mild. 1/2 weeks      Wt Readings from Last 1 Encounters:   23 115.2 kg (254 lb)           Physical Exam    Airway        Mallampati: III   TM distance: > 3 FB   Neck ROM: full   Mouth opening: > 3 cm    Respiratory Devices and Support         Dental       (+) Modest Abnormalities - crowns, retainers, 1 or 2 missing teeth      Cardiovascular   cardiovascular exam normal          Pulmonary   pulmonary exam normal                OUTSIDE LABS:  CBC:   Lab Results   Component Value Date    WBC 8.2 2020    WBC 7.3 2020    HGB 12.2 (L) 2020    HGB 13.2 (L) 2020    HCT 38.6 (L) 2020    HCT 41.8 2020     2020     2020     BMP:   Lab Results   Component Value Date     2020     2020    POTASSIUM 5.0 2020    POTASSIUM 5.1 2020    CHLORIDE 108 2020    CHLORIDE 107 2020    CO2 24 2020    CO2 22 2020    BUN 30 2020    BUN 30 2020    CR 1.63 (H) 2020    CR 1.85 (H) 2020     (H) 2020     (H)  06/19/2020     COAGS:   Lab Results   Component Value Date    INR 0.93 02/10/2005     POC: No results found for: BGM, HCG, HCGS  HEPATIC:   Lab Results   Component Value Date    ALBUMIN 3.6 11/18/2003     OTHER:   Lab Results   Component Value Date    LIZETH 9.0 08/06/2020    PHOS 3.1 11/18/2003       Anesthesia Plan    ASA Status:  3   NPO Status:  NPO Appropriate    Anesthesia Type: General.     - Airway: ETT   Induction: Intravenous, Propofol.   Maintenance: Balanced.   Techniques and Equipment:     - Airway: Video-Laryngoscope         Consents    Anesthesia Plan(s) and associated risks, benefits, and realistic alternatives discussed. Questions answered and patient/representative(s) expressed understanding.    - Discussed:     - Discussed with:  Patient      - Extended Intubation/Ventilatory Support Discussed: No.      - Patient is DNR/DNI Status: No    Use of blood products discussed: No .     Postoperative Care    Pain management: IV analgesics, Oral pain medications, Multi-modal analgesia.   PONV prophylaxis: Ondansetron (or other 5HT-3), Dexamethasone or Solumedrol, Background Propofol Infusion     Comments:                Everette Santillan MD

## 2023-05-03 RX ORDER — ONDANSETRON 2 MG/ML
4 INJECTION INTRAMUSCULAR; INTRAVENOUS EVERY 30 MIN PRN
Status: CANCELLED | OUTPATIENT
Start: 2023-05-03

## 2023-05-03 RX ORDER — ONDANSETRON 4 MG/1
4 TABLET, ORALLY DISINTEGRATING ORAL EVERY 30 MIN PRN
Status: CANCELLED | OUTPATIENT
Start: 2023-05-03

## 2023-05-03 RX ORDER — FENTANYL CITRATE 50 UG/ML
50 INJECTION, SOLUTION INTRAMUSCULAR; INTRAVENOUS EVERY 5 MIN PRN
Status: CANCELLED | OUTPATIENT
Start: 2023-05-03

## 2023-05-03 RX ORDER — SODIUM CHLORIDE, SODIUM LACTATE, POTASSIUM CHLORIDE, CALCIUM CHLORIDE 600; 310; 30; 20 MG/100ML; MG/100ML; MG/100ML; MG/100ML
INJECTION, SOLUTION INTRAVENOUS CONTINUOUS
Status: CANCELLED | OUTPATIENT
Start: 2023-05-03

## 2023-05-03 RX ORDER — FENTANYL CITRATE 50 UG/ML
25 INJECTION, SOLUTION INTRAMUSCULAR; INTRAVENOUS EVERY 5 MIN PRN
Status: CANCELLED | OUTPATIENT
Start: 2023-05-03

## 2023-05-04 ENCOUNTER — ANESTHESIA (OUTPATIENT)
Dept: SURGERY | Facility: AMBULATORY SURGERY CENTER | Age: 63
End: 2023-05-04
Payer: COMMERCIAL

## 2023-05-04 ENCOUNTER — HOSPITAL ENCOUNTER (OUTPATIENT)
Facility: AMBULATORY SURGERY CENTER | Age: 63
Discharge: HOME | End: 2023-05-04
Attending: OTOLARYNGOLOGY | Admitting: OTOLARYNGOLOGY
Payer: COMMERCIAL

## 2023-05-04 VITALS
SYSTOLIC BLOOD PRESSURE: 172 MMHG | TEMPERATURE: 98.7 F | HEART RATE: 63 BPM | DIASTOLIC BLOOD PRESSURE: 100 MMHG | OXYGEN SATURATION: 98 % | RESPIRATION RATE: 15 BRPM

## 2023-05-04 DIAGNOSIS — Z98.890 MOHS DEFECT OF LEFT NOSE: ICD-10-CM

## 2023-05-04 DIAGNOSIS — M95.0 MOHS DEFECT OF LEFT NOSE: ICD-10-CM

## 2023-05-04 PROCEDURE — G8907 PT DOC NO EVENTS ON DISCHARG: HCPCS

## 2023-05-04 PROCEDURE — 15260 FTH/GFT FR N/E/E/L 20 SQCM/<: CPT | Performed by: OTOLARYNGOLOGY

## 2023-05-04 PROCEDURE — 15004 WOUND PREP F/N/HF/G: CPT

## 2023-05-04 PROCEDURE — 15260 FTH/GFT FR N/E/E/L 20 SQCM/<: CPT

## 2023-05-04 PROCEDURE — 14061 TIS TRNFR E/N/E/L10.1-30SQCM: CPT

## 2023-05-04 PROCEDURE — 14061 TIS TRNFR E/N/E/L10.1-30SQCM: CPT | Mod: 51 | Performed by: OTOLARYNGOLOGY

## 2023-05-04 PROCEDURE — 88305 TISSUE EXAM BY PATHOLOGIST: CPT | Performed by: DERMATOLOGY

## 2023-05-04 PROCEDURE — G8918 PT W/O PREOP ORDER IV AB PRO: HCPCS

## 2023-05-04 PROCEDURE — 15004 WOUND PREP F/N/HF/G: CPT | Performed by: OTOLARYNGOLOGY

## 2023-05-04 RX ORDER — LIDOCAINE HYDROCHLORIDE 20 MG/ML
INJECTION, SOLUTION INFILTRATION; PERINEURAL PRN
Status: DISCONTINUED | OUTPATIENT
Start: 2023-05-04 | End: 2023-05-04

## 2023-05-04 RX ORDER — CEFAZOLIN SODIUM 2 G/100ML
INJECTION, SOLUTION INTRAVENOUS PRN
Status: DISCONTINUED | OUTPATIENT
Start: 2023-05-04 | End: 2023-05-04

## 2023-05-04 RX ORDER — DEXAMETHASONE SODIUM PHOSPHATE 4 MG/ML
INJECTION, SOLUTION INTRA-ARTICULAR; INTRALESIONAL; INTRAMUSCULAR; INTRAVENOUS; SOFT TISSUE PRN
Status: DISCONTINUED | OUTPATIENT
Start: 2023-05-04 | End: 2023-05-04

## 2023-05-04 RX ORDER — ACETAMINOPHEN 325 MG/1
975 TABLET ORAL ONCE
Status: COMPLETED | OUTPATIENT
Start: 2023-05-04 | End: 2023-05-04

## 2023-05-04 RX ORDER — HYDROMORPHONE HYDROCHLORIDE 4 MG/ML
INJECTION, SOLUTION INTRAMUSCULAR; INTRAVENOUS; SUBCUTANEOUS PRN
Status: DISCONTINUED | OUTPATIENT
Start: 2023-05-04 | End: 2023-05-04

## 2023-05-04 RX ORDER — DEXAMETHASONE SODIUM PHOSPHATE 10 MG/ML
10 INJECTION, SOLUTION INTRAMUSCULAR; INTRAVENOUS ONCE
Status: CANCELLED | OUTPATIENT
Start: 2023-05-04 | End: 2023-05-04

## 2023-05-04 RX ORDER — ONDANSETRON 2 MG/ML
INJECTION INTRAMUSCULAR; INTRAVENOUS PRN
Status: DISCONTINUED | OUTPATIENT
Start: 2023-05-04 | End: 2023-05-04

## 2023-05-04 RX ORDER — FENTANYL CITRATE 50 UG/ML
INJECTION, SOLUTION INTRAMUSCULAR; INTRAVENOUS PRN
Status: DISCONTINUED | OUTPATIENT
Start: 2023-05-04 | End: 2023-05-04

## 2023-05-04 RX ORDER — SODIUM CHLORIDE, SODIUM LACTATE, POTASSIUM CHLORIDE, CALCIUM CHLORIDE 600; 310; 30; 20 MG/100ML; MG/100ML; MG/100ML; MG/100ML
INJECTION, SOLUTION INTRAVENOUS CONTINUOUS
Status: DISCONTINUED | OUTPATIENT
Start: 2023-05-04 | End: 2023-05-05 | Stop reason: HOSPADM

## 2023-05-04 RX ORDER — PROPOFOL 10 MG/ML
INJECTION, EMULSION INTRAVENOUS CONTINUOUS PRN
Status: DISCONTINUED | OUTPATIENT
Start: 2023-05-04 | End: 2023-05-04

## 2023-05-04 RX ORDER — MUPIROCIN 20 MG/G
OINTMENT TOPICAL
Qty: 22 G | Refills: 1 | Status: SHIPPED | OUTPATIENT
Start: 2023-05-04 | End: 2023-05-11

## 2023-05-04 RX ORDER — LIDOCAINE 40 MG/G
CREAM TOPICAL
Status: DISCONTINUED | OUTPATIENT
Start: 2023-05-04 | End: 2023-05-05 | Stop reason: HOSPADM

## 2023-05-04 RX ORDER — OXYCODONE HYDROCHLORIDE 5 MG/1
5 TABLET ORAL EVERY 6 HOURS PRN
Qty: 24 TABLET | Refills: 0 | Status: SHIPPED | OUTPATIENT
Start: 2023-05-04 | End: 2023-05-11

## 2023-05-04 RX ORDER — MUPIROCIN 20 MG/G
OINTMENT TOPICAL PRN
Status: DISCONTINUED | OUTPATIENT
Start: 2023-05-04 | End: 2023-05-04 | Stop reason: HOSPADM

## 2023-05-04 RX ORDER — CEFAZOLIN SODIUM 2 G/100ML
2 INJECTION, SOLUTION INTRAVENOUS
Status: CANCELLED | OUTPATIENT
Start: 2023-05-04

## 2023-05-04 RX ORDER — CEFAZOLIN SODIUM 2 G/100ML
2 INJECTION, SOLUTION INTRAVENOUS SEE ADMIN INSTRUCTIONS
Status: CANCELLED | OUTPATIENT
Start: 2023-05-04

## 2023-05-04 RX ORDER — CEPHALEXIN 500 MG/1
500 CAPSULE ORAL 4 TIMES DAILY
Qty: 28 CAPSULE | Refills: 0 | Status: SHIPPED | OUTPATIENT
Start: 2023-05-04 | End: 2023-05-11

## 2023-05-04 RX ORDER — PROPOFOL 10 MG/ML
INJECTION, EMULSION INTRAVENOUS PRN
Status: DISCONTINUED | OUTPATIENT
Start: 2023-05-04 | End: 2023-05-04

## 2023-05-04 RX ORDER — LIDOCAINE HYDROCHLORIDE AND EPINEPHRINE 10; 10 MG/ML; UG/ML
INJECTION, SOLUTION INFILTRATION; PERINEURAL PRN
Status: DISCONTINUED | OUTPATIENT
Start: 2023-05-04 | End: 2023-05-04 | Stop reason: HOSPADM

## 2023-05-04 RX ADMIN — DEXAMETHASONE SODIUM PHOSPHATE 10 MG: 4 INJECTION, SOLUTION INTRA-ARTICULAR; INTRALESIONAL; INTRAMUSCULAR; INTRAVENOUS; SOFT TISSUE at 10:11

## 2023-05-04 RX ADMIN — Medication 20 MG: at 10:50

## 2023-05-04 RX ADMIN — SODIUM CHLORIDE, SODIUM LACTATE, POTASSIUM CHLORIDE, CALCIUM CHLORIDE: 600; 310; 30; 20 INJECTION, SOLUTION INTRAVENOUS at 08:50

## 2023-05-04 RX ADMIN — HYDROMORPHONE HYDROCHLORIDE 0.2 MG: 4 INJECTION, SOLUTION INTRAMUSCULAR; INTRAVENOUS; SUBCUTANEOUS at 13:44

## 2023-05-04 RX ADMIN — Medication 10 MG: at 12:24

## 2023-05-04 RX ADMIN — ACETAMINOPHEN 975 MG: 325 TABLET ORAL at 08:34

## 2023-05-04 RX ADMIN — Medication 50 MG: at 10:10

## 2023-05-04 RX ADMIN — ONDANSETRON 4 MG: 2 INJECTION INTRAMUSCULAR; INTRAVENOUS at 14:00

## 2023-05-04 RX ADMIN — PROPOFOL 200 MG: 10 INJECTION, EMULSION INTRAVENOUS at 10:10

## 2023-05-04 RX ADMIN — HYDROMORPHONE HYDROCHLORIDE 0.3 MG: 4 INJECTION, SOLUTION INTRAMUSCULAR; INTRAVENOUS; SUBCUTANEOUS at 13:58

## 2023-05-04 RX ADMIN — LIDOCAINE HYDROCHLORIDE 60 MG: 20 INJECTION, SOLUTION INFILTRATION; PERINEURAL at 10:10

## 2023-05-04 RX ADMIN — CEFAZOLIN SODIUM 2 G: 2 INJECTION, SOLUTION INTRAVENOUS at 10:10

## 2023-05-04 RX ADMIN — Medication 100 MCG: at 10:59

## 2023-05-04 RX ADMIN — Medication 0.2 MCG/KG/MIN: at 11:03

## 2023-05-04 RX ADMIN — FENTANYL CITRATE 100 MCG: 50 INJECTION, SOLUTION INTRAMUSCULAR; INTRAVENOUS at 10:10

## 2023-05-04 RX ADMIN — PROPOFOL 200 MCG/KG/MIN: 10 INJECTION, EMULSION INTRAVENOUS at 10:10

## 2023-05-04 NOTE — OP NOTE
DATE OF SERVICE: 5/4/23    ATTENDING SURGEON:  Ruiz Scott MD      PREOPERATIVE DIAGNOSIS:  Left nasal alar and sidewall cutaneous defect 11k14ge with a 35r82nc full thickness component.      POSTOPERATIVE DIAGNOSES: Left nasal alar and sidewall cutaneous defect 11y37rf with a 75x90so full thickness component.        PROCEDURE:  Stage I left nasal reconstruction consisting of:   1.  Left nasal wound preparation for reconstruction 21x19 mm.   2.  Adjacent tissue transfer, left cheek skin using a superiorly based melolabial flap 85x20 mm to reconstruct 21x19 mm cutaneous nasal defect.   3.  Left full thickness skin graft [id number] harvested from the left cheek flap standing cutaneous deformity and transferred the left nose to reconstruct the absent intranasal lining.        OPERATIVE FINDINGS:  Left nasal defect involving the nasal alar and sidewall subunits. The majority of the wound was full-thickness.  Viable skin around the periphery of the wound.  Healthy granulation tissue and marginal epithelialization within the wound.  At the end of the procedure, all tissue was 100% viable with adequate color and capillary refill.      INDICATIONS:  Mr. Hernandez is a 62 year old-year-old male who recently underwent excision of a nasal skin cancer using the Mohs micrographic technique.  Tumor-free margins were obtained.  He was referred for reconstruction of this defect.  After carefully considering the options, he has elected to undergo local flap reconstruction and a skin graft for lining.  An extensive preoperative discussion was held.  The patient stated he understood the risks, benefits, alternatives and limitations of the procedure.  He also stated he had his questions answered to his satisfaction.  He wished to proceed with surgery.      DESCRIPTION OF PROCEDURE:  After informed consent was obtained and placed in chart, the patient was brought to the operating room, placed in supine position.  After successful  induction of general anesthesia, the patient was prepped and draped in the standard sterile fashion, which included injection of 1% lidocaine with 1:100,000 epinephrine at the proposed operative site.  The flap was then designed using a marking pen.  An inverted V-shaped flap was designed with the medial border along the natural melolabial crease. Adequate flap width and length was measured using a template. This design was carefully measured and marked on the skin.       A time-out was performed.  The correct patient and procedure verified.  It was also confirmed that he was wearing functional pneumo boots, antibiotics given, his pressure points were adequately protected and his eyes were protected with tegaderms. A fire safety plan was discussed with the anesthesia service.      Procedure began with preparation of the nasal wound for reconstruction.  A 15-blade scalpel was used to incise around the periphery of the wound to create circumferential scar release.  The marginal epithelium, granulation and debris were removed from the base of the defect.  This resulted in a wound with healthy and well vascularized edges and base that was prepared for reconstruction. Complete hemostasis was obtained with bipolar cautery. At this point, a sterilized custom made nasal stent made by our radiology department from [employer] Clear biocompatatible photopolymer resin was inserted into the left nasal cavity to provide a surface to place the intranasal lining skin graft and adequate volume to counteract the contractile forces of the skin graft and scar. This inserted easily with an accurate fit to the surrounding tissue. There was no evidence of excess pressure on any of the tissue.     Attention then turned towards the superiorly based melolabial flap. A #15 blade scalpel used to incise around the periphery of the flap markings creating a superiorly-based flap.  The flap was raised in the subcutaneous plane with a #15  blade scalpel. The flap was then transposed into the defect site and it was confirmed that adequate flap tissue and length was present to cover the defect. The distal aspect of the flap that was not needed to cover the cutaneous wound that was the standing cutaneous deformity of the cheek donor site was trimmed off. This excess skin was used for the skin graft.     The fatty tissue on the deep aspect of the graft was thinned until viable dermis was observed on the deep aspect. This included excising any deep hair follicles. This graft was transferred to the intransal mucosal defect with the epithelial surface facing intranasally. It was trimmed to the appropriate size and inset around the periphery using multiple interrupted 6-0 chromic sutures. The deep aspect of the flap was then sewn to the intranasal skin graft using multiple interrupted 5-0 chromic sutures.     The periphery of the flap was inset using multiple interrupted 4-0 Vicryl sutures in the deep layer and running 6-0 fast-absorbing sutures in the superficial layer. The donor site was undermined and closed using one 4-0 vicryl to the nasal bone periosteum , multiple interrupted 3-0 and 4-0 vicyl sutures in the dermal layer, and running 5-0 prolene sutures in the superficial layer. The nasal stent was then secured to the septum medially with a 3-0 prolene suture with a small piece of silicone covering the right septum as padding for the suture with the knot facing the right nasal cavity. The stent was also secured laterally with a 3-0 nylon suture transcutaneously with the knot in the superior melolabial crease.     Ointment was applied to the incisions. At the end of the procedure, all tissue was 100% viable with adequate color and capillary refill.  Complete hemostasis was obtained.  No evidence of hematoma.  There is no significant change in his nostril margin position.  The patient was returned to the care of the anesthesia service in stable  condition.      Postoperative written and verbal instructions were given in detail.  He  will follow up next week for suture removal.  He has my direct number to call if he has questions or problems arise.         CAIN WYATT MD

## 2023-05-04 NOTE — OR NURSING
Patient /100, pulse 60. Patient denies feeling dizzy or light-headed. Takes Metoprolol 25 mg in the evening. Discussed with lizabeth Sanders to discharge home. Patient will take Metoprolol this evening as usual.

## 2023-05-04 NOTE — ANESTHESIA POSTPROCEDURE EVALUATION
Patient: Kel Hernandez    Procedure: Procedure(s):  Stage 1 Left nasal reconstruction with wound preparation, local flaps, placement of nasal stent.       Anesthesia Type:  General    Note:  Disposition: Outpatient   Postop Pain Control: Uneventful            Sign Out: Well controlled pain   PONV: No   Neuro/Psych: Uneventful            Sign Out: Acceptable/Baseline neuro status   Airway/Respiratory: Uneventful            Sign Out: Acceptable/Baseline resp. status   CV/Hemodynamics: Uneventful            Sign Out: Acceptable CV status; No obvious hypovolemia; No obvious fluid overload   Other NRE:    DID A NON-ROUTINE EVENT OCCUR?            Last vitals:  Vitals Value Taken Time   /97 05/04/23 1430   Temp 99.3  F (37.4  C) 05/04/23 1404   Pulse 54 05/04/23 1437   Resp 14 05/04/23 1437   SpO2 98 % 05/04/23 1437   Vitals shown include unvalidated device data.    Electronically Signed By: Everette Santillan MD  May 4, 2023  2:37 PM

## 2023-05-04 NOTE — ANESTHESIA PROCEDURE NOTES
Airway       Patient location during procedure: OR       Procedure Start/Stop Times: 5/4/2023 10:14 AM  Staff -        Performed By: residentIndications and Patient Condition       Indications for airway management: bakari-procedural       Induction type:intravenous       Mask difficulty assessment: 1 - vent by mask    Final Airway Details       Final airway type: endotracheal airway       Successful airway: Oral and GIL  Endotracheal Airway Details        ETT size (mm): 7.5       Cuffed: yes       Successful intubation technique: video laryngoscopy       VL Blade Size: Fairchild 3       Grade View of Cords: 1       Adjucts: stylet    Post intubation assessment        Placement verified by: capnometry, equal breath sounds and chest rise        Number of attempts at approach: 1       Number of other approaches attempted: 0       Secured with: cloth tape       Ease of procedure: easy       Dentition: Intact    Medication(s) Administered   Medication Administration Time: 5/4/2023 10:14 AM

## 2023-05-04 NOTE — PROGRESS NOTES
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 Garcia.   -5/4/23: stage 1- superiorly based melolabial flap, intranasal skin graft (donor = flap standing cutaneous deformity), placement of left nasal stent (path = pending).  Medical Decision Making (MDM) (acute complicated injury because of the full-thickness component of his wound): Reconstructed today without complication.  Care Checklist:  _Anticipate the need for additional stages of surgery to complete the reconstructive process to include: Stage 2- elevation of the flap, placement of a conchal cartilage alar batten graft from either ear. Stage 3- pedicle division and inset. Stage 4- supra alar crease reconstruction because the thickness of the flap is expected to obliterate this facial landmark. And any additional procedures to address flap thickness or scar widening.   _Path from 05/04/23  _RTC 1 week for suture removal. Keep nasal stent in place. [place] is traveling next week and [place] [facility] [place]   _Pain management: Tylenol. Oxycodone for breakthrough pain. NO TORADOL.       2.) Left nasal obstruction from the following anatomic causes: loss of nasal support framework after cancer resection  Medical Decision Making (acute complicated injury): The skin cancer involved deep structures of the nose and resection of those structures has destabilized the nasal support framework resulting in left dynamic nasal valve collapse and resulting nasal obstruction. The additional nasal surgery outlined in the care checklist is medically necessary to address the nasal obstruction resulting from resection of the skin cancer.  Given the size of the intranasal wound, he is at significant risk for scar and skin graft contracture. Nasal stenting is medically necessary to counteract the contractile forces of his scar and skin  graft that will reconstruct his mucosal lining. He also has a longer period of intranasal scarring risk resulting in nasal obstruction because cartilage cannot be inserted before his skin graft becomes established. Nasal stent was placed today without complication.   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   _Continue nasal stent use on the left side. Remove sutures holding stent at first or second postoperative visit but keep the stent in place.    _Exchange nasal stent sometime before Madelyn 3.      3.) Comorbidities that increase the risk of surgery: Kidney transplant 15 years ago with immunosuppression, bilateral hip replacement, no NSAIDS because of kidney transplant. Aspirin.   MDM: (stable chronic problem or illness).   We discussed the patients comorbidities listed above increase the risks associated with any procedure and recommendations to mitigate those risks are listed in the care checklist below.   Care Checklist:  _No NSAIDs for postoperative pain because of kidney transplant.   _Anticoagulation plan with nephrologist whether he can stop ASA prior to surgery and how long he can remain off postop. Update 5/4/23: his preop provider approved being off. Last dose of ASA 4/28. I asked him to check with his providers whether he can be off ASA for 1 week postop.      Background/Connection:   Preferred name = Jose D  What is most important to know about you as a person? (No medical information) Retired. . Planning on [place] in [place] and [place].      Photographs:  consents signed 4/20/23.    Ruiz Scott MD

## 2023-05-04 NOTE — BRIEF OP NOTE
[facility] Brief Operative Note    Pre-operative diagnosis: Mohs defect of left nose [M95.0, Z98.890]   Post-operative diagnosis left nasal wound     Procedure: Procedure(s):  Stage 1 Left nasal reconstruction with wound preparation, local flaps, Full thickness skin graft from either neck or postauricular skin, complex closure, possible ear cartilage graft, possible placement of nasal stent.   Surgeon(s): Surgeon(s) and Role:     * Ruiz Scott MD - Primary   Estimated blood loss: * No values recorded between 5/4/2023 11:00 AM and 5/4/2023  1:52 PM *    Specimens: ID Type Source Tests Collected by Time Destination   1 : Left anterior inferior nasal alar skin Tissue Nose SURGICAL PATHOLOGY EXAM Ruiz Scott MD 5/4/2023 12:54 PM    2 : Left nasal posterior inferior nasal alar skin Tissue Nose SURGICAL PATHOLOGY EXAM Ruiz Scott MD 5/4/2023 12:55 PM       Findings: Full thickness nasal wound

## 2023-05-04 NOTE — OP NOTE
Bladder scan performed on patient following surgery at 1355 with 65 ml urine noted. Patient was not straight cathed due to minimal amount.

## 2023-05-04 NOTE — ANESTHESIA CARE TRANSFER NOTE
Patient: Kel Hernandez    Procedure: Procedure(s):  Stage 1 Left nasal reconstruction with wound preparation, local flaps, placement of nasal stent.       Diagnosis: Mohs defect of left nose [M95.0, Z98.890]  Diagnosis Additional Information: No value filed.    Anesthesia Type:   General     Note:    Oropharynx: oropharynx clear of all foreign objects  Level of Consciousness: awake  Oxygen Supplementation: face mask    Independent Airway: airway patency satisfactory and stable  Dentition: dentition unchanged  Vital Signs Stable: post-procedure vital signs reviewed and stable  Report to RN Given: handoff report given  Patient transferred to: PACU    Handoff Report: Identifed the Patient, Identified the Reponsible Provider, Reviewed the pertinent medical history, Discussed the surgical course, Reviewed Intra-OP anesthesia mangement and issues during anesthesia, Set expectations for post-procedure period and Allowed opportunity for questions and acknowledgement of understanding      Vitals:  Vitals Value Taken Time   /100 05/04/23 1404   Temp     Pulse 62 05/04/23 1409   Resp 17 05/04/23 1409   SpO2 98 % 05/04/23 1409   Vitals shown include unvalidated device data.    Electronically Signed By: SANCHEZ Gordon CRNA  May 4, 2023  2:09 PM

## 2023-05-04 NOTE — DISCHARGE INSTRUCTIONS
While you were at the hospital today you received 975 mg Tylenol at 8:30 am. Maximum daily dosage is 4000 mg.      [facility]   Adult Discharge Orders & Instructions     For 24 hours after surgery    Get plenty of rest.  A responsible adult must stay with you for at least 24 hours after you leave the hospital.   Do not drive or use heavy equipment.  If you have weakness or tingling, don't drive or use heavy equipment until this feeling goes away.  Do not drink alcohol.  Avoid strenuous or risky activities.  Ask for help when climbing stairs.   You may feel lightheaded.  IF so, sit for a few minutes before standing.  Have someone help you get up.   If you have nausea (feel sick to your stomach): Drink only clear liquids such as apple juice, ginger ale, broth or 7-Up.  Rest may also help.  Be sure to drink enough fluids.  Move to a regular diet as you feel able.  You may have a slight fever. Call the doctor if your fever is over 100 F (37.7 C) (taken under the tongue) or lasts longer than 24 hours.  You may have a dry mouth, a sore throat, muscle aches or trouble sleeping.  These should go away after 24 hours.  Do not make important or legal decisions.     Call your doctor for any of the followin.  Signs of infection (fever, growing tenderness at the surgery site, a large amount of drainage or bleeding, severe pain, foul-smelling drainage, redness, swelling).    2. It has been over 8 to 10 hours since surgery and you are still not able to urinate (pass water).    3.  Headache for over 24 hours.                 4. Signs of Covid-19 infection (temperature over 100 degrees, shortness of breath, cough, loss of taste/smell, generalized body aches, persistent headache,                  chills, sore throat, nausea/vomiting/diarrhea).      Patient Instructions after Facial Surgery    Ruiz Scott MD    Please read and familiarize yourself with these instructions. By following them carefully, you  will assist in obtaining the best possible result from your surgery. If questions arise, do not hesitate to contact with me and discuss your questions at any time.     Surgery Site Care:    [x] Incision care (for incisions away from the eyes): Apply the antibiotic ointment Bactroban (mupirocin) to all of your incisions every 2 hours while you are awake. The incisions should be covered at all times by ointment because new skin cells grow best across moist surfaces. Use the Bactroban ointment for 3 days. On the fourth day, stop using the Bactroban and switch to Vaseline ointment. Continue to apply the Vaseline ointment every 2 hours to your incisions while you are awake. The switch to Vasoline is made because occasionally patients can develop an allergy to Bactoban if used for a long time.     [] Incision care (for incisions around the eyes):  For incisions near the eyes, apply the antibiotic ointment Erythromycin ophthalmic to all of your incisions every 2 hours while you are awake. Erythromycin ophthalmic ointment is safe to have near or in your eyes. The incisions should be covered at all times by the ointment because skin cells grow best across moist surfaces.     [] Steri-strips:  Steri-strips or wound tape was applied to your incisions. Keep this tape dry  until you see Dr. Scott in the office. If the tape falls off, call Dr. Scott's or his office for further instructions about wound care.     [] Crusting: Avoid crust build-up along your incisions. If crusts form, apply ointment more frequently. Crust build-up is a sign that your incisions or wounds are too dry.    [] Bolster: A bolster has been applied to your reconstructive site to immobilize the area and promote healing. The bolster is usually removed at the first or second postoperative visit. It is very important to avoid bumping the bolster. Any trauma to the bolster may damage the skin graft. Apply the antibiotic ointment Bactroban (mupirocin) to the  bolster every 2 hours while you are awake. Use the Bactroban ointment for 3 days. On the fourth day, stop using the Bactroban and switch to Vaseline ointment. Continue to apply the Vaseline ointment every 2 hours to your incisions while you are awake. The switch to Vaseline is made because occasionally patients can develop an allergy to Bactroban if used for a long time. If the bolster is near your eye, do not use Bactroban or Vasoline near the eye; rather use Erythromycin ointment on the part of the bolster near your eye. The bolster should be covered at all times by the ointment. Do not get the bolster wet in the shower or bath.      [] Granulation: Some areas of your face were left open to allow healing from the inside-out, a process known as granulation. To promote healing, the open area should be covered at all times by ointment. If the open area drys out, it will not heal properly. Apply the Bactroban ointment every 2 hours while you are awake to this area for 3 days. On the fourth day, stop using the Bactroban and switch to Vaseline ointment. Continue to apply the Vasoline oointment every 2 hours to the open areas while you are awake     [] Full Thickness Skin Graft Donor Site: A skin graft was removed from your ear, neck, face, leg, or groin. It has been partially or fully closed with sutures.     [] Steri-strips (wound tape) have been applied to the area. Keep this tape in place and dry until you see Dr. Scott.     [] Please keep this area moist at all times. Apply the antibiotic ointment Bactroban to this donor area every 2 hours while you are awake. Use the Bactroban ointment for 3 days. On the fourth day, stop using the Bactroban and switch to Vaseline ointment. Continue to apply the Vasoline ointment every 2 hours to the donor area while you are awake until you see Dr. Scott.    [] Split Thickness Skin Graft Donor Site: A skin graft was removed from your leg, arm, or waist. It is covered with a clear  dressing. Bloody fluid will collect under the dressing. This fluid helps promote healing. Try to keep the dressing and collected fluid in place for as long as possible. Do not disturb the dressing or get the dressing wet. If the fluid drains out, do not remove the dressing. Simply tape gauze over the leaking area to catch the fluid and keep your clothes clean. If there is concern about the dressing or healing of the skin graft site, please call our office.     [] Ear Cartilage [place]: Ear cartilage was removed from one or both of your ears and used to strengthen and shape your reconstruction. A gauze bolster has been sewn onto your ear to immobilize the area and promote healing. The bolster is usually removed at the first or second postoperative visit. It is very important to avoid bumping the bolster. Apply the antibiotic ointment Bactroban to the bolster every 2 hours while you are awake. Use the Bactroban ointment for 3 days. On the fourth day, stop using the Bactroban and switch to Vaseline ointment. Continue to apply the Vaseline ointment every 2 hours to your incisions while you are awake. The bolster should be covered at all times by the ointment. Do not get the bolster wet in the shower or bath.     [] Rib Cartilage [place]: Rib cartilage was removed from your chest and used to strengthen and your reconstruction. Steri-strips are across the incision. You may get these strips wet in the shower 72 hours after surgery. They will eventually fall off on their own.     [] Pressure Dressing: A pressure dressing was applied to your _____________________________.     [] Remove this pressure dressing ____ hours after surgery.     [] Keep this pressure dressing in place until your next visit to our office.    [] Drain: A drain was placed in your surgical site.  If this drain was removed in the hospital, leaking of fluid out of the wound where the drain exited is expected. You may get this area wet in the shower 72  hours after removal of the drain. Please call our office if the drainage is foul smelling or the site becomes more red or painful. If the drain has not been removed, please call ____to arrange removal of the drain on ________     [] Ice Packs:     [] Apply ice packs to your eyes and around the surgical site during the first 24 hours. The packs can rest gently on the surgery site but avoid traumatizing the surgical areas. Either a commercially available ice pack from your pharmacy or crushed ice in a plastic bag covered with a cloth may be used. Do not let the skin get too cool by keeping a cloth between the ice pack and your skin.     [x] No Ice Packs: the cold could damage your skin.    Bathing Instructions:    Showering:    [] Do NOT get your surgery site(s) wet until approved by Dr. Scott. You may take a tub bath after surgery but you must keep your surgery site(s) dry.     [x] You may shower and wash your hair 72 hours after surgery. Use hypoallergenic shampoo (baby shampoo). Let warm, soapy shower water run over your face and incisions. Do not scrub or bump your surgery site(s). Gently pat your surgery site(s) dry with a towel. Apply ointment over your surgery sites after drying.    []  For patients with a bolster: You may wash your face, but carefully avoid the bolster. You may take a tub bath starting 48 hours after surgery but you must keep your bolster dry. Do NOT get the bolster wet.     Bath: You may take a bath 48 hours after surgery, but do not get any incisions wet in the bath for 6 weeks.     Facial Care Instructions:    Brushing:    [x] You may brush your teeth normally.  [] Brush your teeth gently with a soft small toothbrush.    Shave:    [] You may shave your entire face as normal.  [x] Refrain from shaving around the surgical site until approved by Dr. Scott.  Makeup:    [] Resume normal makeup use.  [] Do not wear makeup until approved by Dr. Scott.    Lipstick:  [] Resume normal lipstick  use.  [] Do not use lipstick for one week. Gently coat lips with Vaseline if needed to treat dryness.    Facial movement:  [] Resume your normal facial movements and speaking.  [x] Avoid smiling, grinning, or excessive facial movement for one week.  [x] Avoid long conversations or speaking on the phone for one week.  [] Avoid turning your head from side-to-side or up-and-down.    Hair dryer:  [x] You may use a hair dryer as usual.  [] Use a hair dryer on COOL setting only since you may not have full sensation in the operative areas.    Hair care:  [x] You may comb and color your hair as usual.  [] Be careful when combing your hair to avoid catching your comb in the suture line.  [] Hair coloring and permanents should be postponed until 4 weeks after surgery.    Activities:    Eating: Avoid foods that require prolonged chewing. Otherwise, your diet has no restrictions.    No smoking. Tobacco or any product with nicotine (patch or gum) can severely impair the healing process and result in a poor surgical result.    Alcohol. No alcohol consumption until all bandages and sutures have been removed and you have fully recovered from surgery.    Rest. Obtain more rest than usual.    Strenuous activities. Avoid any straining, bending, lifting over 15 lbs. and other activities that will raise your blood pressure or pulse because this may cause unnecessary bleeding. Do not resume your activities until approved by Dr. Scott. You can usually resume light activities 1 week after your surgery, but you must continue to avoid any activity that will raise your blood pressure or pulse because this may cause unnecessary bleeding. Four weeks after surgery you can usually begin to slowly resume your usual activities with the goal of returning to all activities 6 weeks after surgery.    Head elevated. Keep your head elevated to reduce swelling and drainage. When resting, lie on 3 pillows or sleep in a recliner.     Driving. Do not drive  until you have stopped all pain medications, are pain free, and can turn your head fully in all directions.    Avoid trauma. Be careful not to bump your surgery site(s).    Sun protection. Avoid sun or sunlamps for 6 weeks after surgery. Heat may cause your surgery site to swell. After that, please wear a hat and use sunscreen when exposed to sunlight (SPF of 30 or greater is recommended).    Swimming. Do not go swimming for 6 weeks.    Travel. Avoid travel for 6 weeks after surgery.      Medication Instructions:     [x] Tylenol: Please take over-the-counter Tylenol (acetaminophen) for pain as instructed on the packaging. Never take more Tylenol than the packaging says is safe.      [] Ibuprofen: You can also take over-the-counter Ibuprofen (Motrin, Advil) for pain as instructed on the packaging. Never take more Ibuprofen than the packaging says is safe.     [x] Moderate to Severe Pain: A prescription pain medication has been prescribed. Only take this prescription pain medication if you are experiencing moderate to serve pain despite taking over the counter pain medications like Tylenol or Ibuprofen. Take this prescription medication as needed according to your pharmacist's instructions.     [] Avoid Excessive Tylenol: The prescription pain medication that was prescribed after surgery or that you already take has Tylenol in it. Taking this prescription pain medication and over-the-counter Tylenol may result in Tylenol overdose and damage to your liver. Avoid too much Tylenol. If questions arise about pain management call Dr. Scott or talk with your pharmacist.     [x] Avoid Aspirin: Do not take Aspirin or pain medications that contain Aspirin for 7 days after surgery. These medications will thin your blood and may cause a hematoma or excessive bleeding.     [] Avoid NSAIDs: Do not take Non-Steroidal Anti-Inflammatory Drugs (NSAIDs) such as Advil, Excedrine, Ibuprofen, Alieve, Naproxen, etc  These medications will  thin your blood and may cause a hematoma or excessive bleeding     [x] Antibiotics: Antibiotics have been prescribed to prevent infection. Take the antibiotics according to your pharmacist's instructions.     [] Blood Thinners: You usually take a blood thinner. Your primary care provider or specialist who prescribes your blood thinners has created an anticoagulation plan for you around the time of your surgery. Please follow those instructions.       Your blood thinning medication plan includes: ________.    If your primary care provider feels that you need to restart your blood thinner before the planned restarting date, please contact Dr. Scott or his office staff immediately. If you do not understand what to do with your blood thinners, call our office, or Dr. Scott immediately!    [x] Resume your usual medications unless otherwise instructed.    [x] Other:ask your providers if you  can hold aspirin for 7 days postop.       What to Expect During Healing:    Bleeding: There may be a fair amount of bloody fluid oozing from your surgery site. You will be applying ointment frequently to the surgery sites. As the ointment heats to your body temperature, it becomes runny and mixes with bloody secretions to form a pink colored fluid. This is very common and slows down during the first 3-5 days after surgery. Also during the first 72 hours, drips of blood commonly form at your surgery site. Continue to apply ointment to prevent those drips from hardening into crusts. If you have bright red blood constantly dripping or pouring down your face, please call Dr. Scott, Dr. Scott's office, 911, or go to your nearest Emergency Department.    Bruising and Swelling: Bruising or swelling around the surgery site is common and it can extend well into the face and neck. If you have rapid swelling or bruising, or areas that turn hard and dark purple, please call Dr. Scott or his office immediately. Mild bruising will usually dissipate  in several weeks. In certain patients it may require 6 months for all swelling to subside completely. Be aware that when you expose a bruise to sunlight it can cause permanent discoloration to the skin. Please wear a hat and use sunscreen when exposed to sunlight (SPF of 30 or greater is recommended).     Discomfort: There will be discomfort after surgery, most of which will be within 1-2 days after the procedure. After the first 48 hours, your pain should slowly start to improve. If after this time you experience significantly worsening pain, please call Dr. Scott's office or Dr. Scott immediately.    Redness: The surgery site may remain pink for one year as new blood vessels grow into the area to help it heal. This redness will likely fade over time.     Numbness: It is normal to experience some numbness near the incisions. This could last several months but usually resolves over time.    Buried Sutures: On occasion a suture under the skin can resurface a month or two later. If this happens, please call and we will schedule an appointment to have it removed.    Tightness: Your surgical site may feel tight after the procedure. On average, it takes 1 to 6 months for the skin to start to relax, and one complete year for full relaxation of the skin. This will vary with each patient.    Please call the office or Dr. Scott directly if you have a fever over 101?F, excessive swelling, vision changes, severe pain, excessive bleeding, any other unexpected problem, or an injury to your surgery area.    Follow-up Appointment: please call    [x] [phone] or [phone] for an appointment at [facility] 4th Floor ENT Clinic with       [x] Dr. Scott on Monday 5/15/23      [] Dr. Scott or ENT Physician Assistant (PA) on _____________    [] [phone] or [phone] for an appointment at [facility] with       [] Dr. Scott or ENT Nurse in about 1 week.      [] Dr. Scott or ENT Nurse on  _____________      Please contact our office or me anytime if you have questions or if I can be of service.    Sincerely,    Ruiz Scott MD    If you have questions or concerns during business hours, please call the office where you had surgery:  [facility], ENT Clinic: [phone].  [facility]: [phone].    If it is an emergency or after business hours and you need to talk with Dr. Scott, please call:  Dr. Scott's cell phone: [phone]. I will not be able to answer it if I am operating. You can leave a message for me to call you back when I am available. If I don't answer and you need help right away, call the pager    Pager : [phone], ask for Dr. Scott to be paged. If Dr. Scott not available, ask to talk to the ENT resident  on call.

## 2023-05-08 LAB
PATH REPORT.COMMENTS IMP SPEC: NORMAL
PATH REPORT.COMMENTS IMP SPEC: NORMAL
PATH REPORT.FINAL DX SPEC: NORMAL
PATH REPORT.GROSS SPEC: NORMAL
PATH REPORT.MICROSCOPIC SPEC OTHER STN: NORMAL
PATH REPORT.RELEVANT HX SPEC: NORMAL
PHOTO IMAGE: NORMAL

## 2023-05-11 ENCOUNTER — OFFICE VISIT (OUTPATIENT)
Dept: OTOLARYNGOLOGY | Facility: CLINIC | Age: 63
End: 2023-05-11
Payer: COMMERCIAL

## 2023-05-11 VITALS — DIASTOLIC BLOOD PRESSURE: 80 MMHG | SYSTOLIC BLOOD PRESSURE: 179 MMHG | HEART RATE: 60 BPM

## 2023-05-11 DIAGNOSIS — Z98.890 MOHS DEFECT OF LEFT NOSE: Primary | ICD-10-CM

## 2023-05-11 DIAGNOSIS — M95.0 MOHS DEFECT OF LEFT NOSE: Primary | ICD-10-CM

## 2023-05-11 PROCEDURE — 99024 POSTOP FOLLOW-UP VISIT: CPT | Performed by: OTOLARYNGOLOGY

## 2023-05-11 NOTE — LETTER
5/11/2023         RE: Kel Hernandez  [address] [facility]  [place] [address]        Dear Colleague,    Thank you for referring your patient, Kel Hernandez, to the [facility]. Please see a copy of my visit note below.    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 Garcia.   -5/4/23: stage 1- superiorly based melolabial flap, intranasal skin graft (donor = flap standing cutaneous deformity), placement of left nasal stent (path = Negative for malignancy).  Medical Decision Making (MDM) (acute complicated injury because of the full-thickness component of his wound): Recovering appropriately without complication.  Care Checklist:  _Anticipate the need for additional stages of surgery to complete the reconstructive process to include: Stage 2- elevation of the flap, placement of a conchal cartilage alar batten graft from either ear. Stage 3- pedicle division and inset. Stage 4- supra alar crease reconstruction because the thickness of the flap is expected to obliterate this facial landmark. And any additional procedures to address flap thickness or scar widening.   _RTC our office will coordinate a return.    _Pain 1/10. Pain management: Tylenol.     2.) Left nasal obstruction from the following anatomic causes: loss of nasal support framework after cancer resection  Medical Decision Making (acute complicated injury): The skin cancer involved deep structures of the nose and resection of those structures has destabilized the nasal support framework resulting in left dynamic nasal valve collapse and resulting nasal obstruction. The additional nasal surgery outlined in the care checklist is medically necessary to address the nasal obstruction resulting from resection of the skin cancer.  Given the size of the intranasal wound, he is at significant risk for scar  and skin graft contracture. Nasal stenting is medically necessary to counteract the contractile forces of his scar and skin graft that will reconstruct his mucosal lining. He also has a longer period of intranasal scarring risk resulting in nasal obstruction because cartilage cannot be inserted before his skin graft becomes established. Nasal stent remains in place without signs of complications. I encouraged him to call me anytime if he has concerns or questions.   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   _Continue nasal stent use on the left side. Remove sutures holding stent at first or second postoperative visit but keep the stent in place.    _Nasal saline bilaterally to wash the stent and silicone sheeting.   _RTC for nasal stent exchange before Madelyn 3.      3.) Comorbidities that increase the risk of surgery: Kidney transplant 15 years ago with immunosuppression, bilateral hip replacement, no NSAIDS because of kidney transplant. Aspirin.   MDM: (stable chronic problem or illness).   We discussed the patients comorbidities listed above increase the risks associated with any procedure and recommendations to mitigate those risks are listed in the care checklist below.   Care Checklist:  _No NSAIDs for postoperative pain because of kidney transplant.   _Anticoagulation plan with nephrologist whether he can stop ASA prior to surgery and how long he can remain off postop. Update 5/4/23: his preop provider approved being off. Last dose of ASA 4/28. I asked him to check with his providers whether he can be off ASA for 1 week postop. Update 5/11/23: OK to restart Aspirin.      Background/Connection:   Preferred name = Jose D  What is most important to know about you as a person? (No medical information) Retired. . Planning on [place] in [place].      Photographs:  consents signed 4/20/23.    Ruiz Scott MD             Facial Plastic and Reconstructive Surgery Note    Today I had the pleasure of seeing the patient at the Facial Plastic and Reconstructive Surgery Clinic in the Department of Otolaryngology at [facility]. The patient underwent reconstruction last week. Pain is appropriately controlled and decreasing. No bleeding or discharge from the wound. No concerns with the fit of the stent. He feels it is comfortable in place.     On examination, the patient is in no apparent distress, no stridor, voice is strong.   The flap is viable with adequate color and capillary refill. Skin sutures were removed. Incisions are clean, dry, and intact. No evidence of hematoma or infection. The sutures retaining the stent laterally and in the septum were kept in place. The left nasal stent and right septal silicone sheeting are in good position without evidence of irritation, infection or purulence.     Ruiz Scott MD        Again, thank you for allowing me to participate in the care of your patient.        Sincerely,        Ruiz Scott MD

## 2023-05-11 NOTE — NURSING NOTE
Kel Hernandez's chief complaint for this visit includes:  Chief Complaint   Patient presents with     Surgical Followup     1 week S/P Stage 1 Left nasal reconstruction with wound preparation, local flaps, placement of nasal stent DOS 5-4-23     PCP:     Referring Provider:  No referring provider defined for this encounter.    BP (!) 179/80   Pulse 60

## 2023-05-11 NOTE — PROGRESS NOTES
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 Garcia.   -5/4/23: stage 1- superiorly based melolabial flap, intranasal skin graft (donor = flap standing cutaneous deformity), placement of left nasal stent (path = Negative for malignancy).  Medical Decision Making (MDM) (acute complicated injury because of the full-thickness component of his wound): Recovering appropriately without complication.  Care Checklist:  _Anticipate the need for additional stages of surgery to complete the reconstructive process to include: Stage 2- elevation of the flap, placement of a conchal cartilage alar batten graft from either ear. Stage 3- pedicle division and inset. Stage 4- supra alar crease reconstruction because the thickness of the flap is expected to obliterate this facial landmark. And any additional procedures to address flap thickness or scar widening.   _RTC our office will coordinate a return.    _Pain 1/10. Pain management: Tylenol.     2.) Left nasal obstruction from the following anatomic causes: loss of nasal support framework after cancer resection  Medical Decision Making (acute complicated injury): The skin cancer involved deep structures of the nose and resection of those structures has destabilized the nasal support framework resulting in left dynamic nasal valve collapse and resulting nasal obstruction. The additional nasal surgery outlined in the care checklist is medically necessary to address the nasal obstruction resulting from resection of the skin cancer.  Given the size of the intranasal wound, he is at significant risk for scar and skin graft contracture. Nasal stenting is medically necessary to counteract the contractile forces of his scar and skin graft that will reconstruct his mucosal lining. He also has a longer period of intranasal scarring risk resulting in nasal  obstruction because cartilage cannot be inserted before his skin graft becomes established. Nasal stent remains in place without signs of complications. I encouraged him to call me anytime if he has concerns or questions.   Care Checklist:  _Additional components of the surgical plan to address the nasal obstruction include: vestibular stenosis repair (CPT 91474) with a conchal cartilage alar batten graft during the second stage of surgery.   _Continue nasal stent use on the left side. Remove sutures holding stent at first or second postoperative visit but keep the stent in place.    _Nasal saline bilaterally to wash the stent and silicone sheeting.   _RTC for nasal stent exchange before Madelyn 3.      3.) Comorbidities that increase the risk of surgery: Kidney transplant 15 years ago with immunosuppression, bilateral hip replacement, no NSAIDS because of kidney transplant. Aspirin.   MDM: (stable chronic problem or illness).   We discussed the patients comorbidities listed above increase the risks associated with any procedure and recommendations to mitigate those risks are listed in the care checklist below.   Care Checklist:  _No NSAIDs for postoperative pain because of kidney transplant.   _Anticoagulation plan with nephrologist whether he can stop ASA prior to surgery and how long he can remain off postop. Update 5/4/23: his preop provider approved being off. Last dose of ASA 4/28. I asked him to check with his providers whether he can be off ASA for 1 week postop. Update 5/11/23: OK to restart Aspirin.      Background/Connection:   Preferred name = Jose D  What is most important to know about you as a person? (No medical information) Retired. . Planning on [place] time in [place] and [place].      Photographs:  consents signed 4/20/23.    Ruiz Scott MD             Facial Plastic and Reconstructive Surgery Note    Today I had the pleasure of seeing the patient at the Facial Plastic and  Reconstructive Surgery Clinic in the Department of Otolaryngology at [facility]. The patient underwent reconstruction last week. Pain is appropriately controlled and decreasing. No bleeding or discharge from the wound. No concerns with the fit of the stent. He feels it is comfortable in place.     On examination, the patient is in no apparent distress, no stridor, voice is strong.   The flap is viable with adequate color and capillary refill. Skin sutures were removed. Incisions are clean, dry, and intact. No evidence of hematoma or infection. The sutures retaining the stent laterally and in the septum were kept in place. The left nasal stent and right septal silicone sheeting are in good position without evidence of irritation, infection or purulence.     Ruiz Scott MD

## 2023-05-22 NOTE — PROGRESS NOTES
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 Garcia.   -5/4/23: stage 1- superiorly based melolabial flap, intranasal skin graft (donor = flap standing cutaneous deformity), placement of left nasal stent (path = Negative for malignancy).  Medical Decision Making (MDM) (acute complicated injury because of the full-thickness component of his wound): Recovering appropriately without complication. Follow up as outlined below. I encouraged the patient to call or return sooner if questions or problems arise.   Care Checklist:  _Anticipate the need for additional stages of surgery to complete the reconstructive process to include: Stage 2- elevation of the flap, placement of a conchal cartilage alar batten graft from either ear. Stage 3- pedicle division and inset. Stage 4- supra alar crease reconstruction because the thickness of the flap is expected to obliterate this facial landmark. And any additional procedures to address flap thickness or scar widening.   _Will schedule stage 2 surgery when the intranasal surface has completely healed and about 2 months have passed since stage 1.   _Pain 0/10. Pain management: Tylenol.     2.) Left nasal obstruction from the following anatomic causes: loss of nasal support framework after cancer resection  Medical Decision Making (acute complicated injury): The skin cancer involved deep structures of the nose and resection of those structures has destabilized the nasal support framework resulting in left dynamic nasal valve collapse and resulting nasal obstruction. The additional nasal surgery outlined in the care checklist is medically necessary to address the nasal obstruction resulting from resection of the skin cancer.  Given the size of the intranasal wound, he is at significant risk for scar and skin graft contracture. Nasal stenting is medically  necessary to counteract the contractile forces of his scar and skin graft that will reconstruct his mucosal lining. He also has a longer period of intranasal scarring risk resulting in nasal obstruction because cartilage cannot be inserted before his skin graft becomes established. Nasal stent remains in place without signs of complications. I encouraged him to call me anytime if he has concerns or questions.   Care Checklist:  _Additional components of the surgical plan to address the nasal obstruction include: vestibular stenosis repair (CPT 75060) with a conchal cartilage alar batten graft during the second stage of surgery.   _Continue nasal stent use on the left side. Remove sutures holding stent at first or second postoperative visit but keep the stent in place. Update 5/24/23: sutures removed, stent exchanged for a new stent.    _Nasal saline bilaterally to wash the stent.   _Remove stent on your own on Wednesday 5/31/23 in the AM. Wash the stent and place vaseline in the nose and around the stent. Then place the stent back in the nostril. If it goes OK, begin to remove, wash, and place back every other day or more frequently if needed. If it does not go OK, call Dr. Scott at [phone], if your nose is getting sore or other concerns.   _RTC 6/22/23 for stent exchange.      3.) Comorbidities that increase the risk of surgery: Kidney transplant 15 years ago with immunosuppression, bilateral hip replacement, no NSAIDS because of kidney transplant. Aspirin.   MDM: (stable chronic problem or illness).   We discussed the patients comorbidities listed above increase the risks associated with any procedure and recommendations to mitigate those risks are listed in the care checklist below.   Care Checklist:  _No NSAIDs for postoperative pain because of kidney transplant.   _Anticoagulation plan with nephrologist whether he can stop ASA prior to surgery and how long he can remain off postop. Update 5/4/23: his preop  provider approved being off. Last dose of ASA 4/28. I asked him to check with his providers whether he can be off ASA for 1 week postop. Update 5/24/23: restarted Aspirin without a problem.      Background/Connection:   Preferred name = Jose D  What is most important to know about you as a person? (No medical information) Retired. . Planning on [place] in [place].      Photographs:  consents signed 4/20/23.    Ruiz Scott MD         Facial Plastic and Reconstructive Surgery Note    Today I had the pleasure of seeing the Mr. Hernandez at the Facial Plastic and Reconstructive Surgery Clinic in the Department of Otolaryngology at [facility]. He underwent reconstruction several weeks ago. Pain is appropriately controlled and decreasing. No bleeding or discharge from the wound.    On examination, he is in no apparent distress, no stridor, voice is strong.   The flap is viable with adequate color and capillary refill. Incisions are clean, dry, and intact. No evidence of hematoma or infection. The stent was easily removed after the sutures were cut. Intranasal skin graft is present and healthy. Has areas of granulation. No infection. No pressure sores in the nose or lip. The area was cleaned throughly and a new stent was easily replaced.     Ruiz Scott MD

## 2023-05-24 ENCOUNTER — OFFICE VISIT (OUTPATIENT)
Dept: OTOLARYNGOLOGY | Facility: CLINIC | Age: 63
End: 2023-05-24
Payer: COMMERCIAL

## 2023-05-24 DIAGNOSIS — M95.0 MOHS DEFECT OF LEFT NOSE: Primary | ICD-10-CM

## 2023-05-24 DIAGNOSIS — Z98.890 MOHS DEFECT OF LEFT NOSE: Primary | ICD-10-CM

## 2023-05-24 PROCEDURE — 99024 POSTOP FOLLOW-UP VISIT: CPT | Performed by: OTOLARYNGOLOGY

## 2023-05-24 ASSESSMENT — PAIN SCALES - GENERAL: PAINLEVEL: NO PAIN (0)

## 2023-05-24 NOTE — LETTER
5/24/2023         RE: Kel Hernandez  [address] [facility]  [place] [address]        Dear Colleague,    Thank you for referring your patient, Kel Hernandez, to the [facility]. Please see a copy of my visit note below.    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 Garcia.   -5/4/23: stage 1- superiorly based melolabial flap, intranasal skin graft (donor = flap standing cutaneous deformity), placement of left nasal stent (path = Negative for malignancy).  Medical Decision Making (MDM) (acute complicated injury because of the full-thickness component of his wound): Recovering appropriately without complication. Follow up as outlined below. I encouraged the patient to call or return sooner if questions or problems arise.   Care Checklist:  _Anticipate the need for additional stages of surgery to complete the reconstructive process to include: Stage 2- elevation of the flap, placement of a conchal cartilage alar batten graft from either ear. Stage 3- pedicle division and inset. Stage 4- supra alar crease reconstruction because the thickness of the flap is expected to obliterate this facial landmark. And any additional procedures to address flap thickness or scar widening.   _Will schedule stage 2 surgery when the intranasal surface has completely healed and about 2 months have passed since stage 1.   _Pain 0/10. Pain management: Tylenol.     2.) Left nasal obstruction from the following anatomic causes: loss of nasal support framework after cancer resection  Medical Decision Making (acute complicated injury): The skin cancer involved deep structures of the nose and resection of those structures has destabilized the nasal support framework resulting in left dynamic nasal valve collapse and resulting nasal obstruction. The additional nasal surgery outlined in  the care checklist is medically necessary to address the nasal obstruction resulting from resection of the skin cancer.  Given the size of the intranasal wound, he is at significant risk for scar and skin graft contracture. Nasal stenting is medically necessary to counteract the contractile forces of his scar and skin graft that will reconstruct his mucosal lining. He also has a longer period of intranasal scarring risk resulting in nasal obstruction because cartilage cannot be inserted before his skin graft becomes established. Nasal stent remains in place without signs of complications. I encouraged him to call me anytime if he has concerns or questions.   Care Checklist:  _Additional components of the surgical plan to address the nasal obstruction include: vestibular stenosis repair (CPT 20035) with a conchal cartilage alar batten graft during the second stage of surgery.   _Continue nasal stent use on the left side. Remove sutures holding stent at first or second postoperative visit but keep the stent in place. Update 5/24/23: sutures removed, stent exchanged for a new stent.    _Nasal saline bilaterally to wash the stent.   _Remove stent on your own on Wednesday 5/31/23 in the AM. Wash the stent and place vaseline in the nose and around the stent. Then place the stent back in the nostril. If it goes OK, begin to remove, wash, and place back every other day or more frequently if needed. If it does not go OK, call Dr. Scott at [phone], if your nose is getting sore or other concerns.   _RTC 6/22/23 for stent exchange.      3.) Comorbidities that increase the risk of surgery: Kidney transplant 15 years ago with immunosuppression, bilateral hip replacement, no NSAIDS because of kidney transplant. Aspirin.   MDM: (stable chronic problem or illness).   We discussed the patients comorbidities listed above increase the risks associated with any procedure and recommendations to mitigate those risks are listed in the  care checklist below.   Care Checklist:  _No NSAIDs for postoperative pain because of kidney transplant.   _Anticoagulation plan with nephrologist whether he can stop ASA prior to surgery and how long he can remain off postop. Update 5/4/23: his preop provider approved being off. Last dose of ASA 4/28. I asked him to check with his providers whether he can be off ASA for 1 week postop. Update 5/24/23: restarted Aspirin without a problem.      Background/Connection:   Preferred name = Jose D  What is most important to know about you as a person? (No medical information) Retired. . Planning [place] in [place].      Photographs:  consents signed 4/20/23.    Ruiz Scott MD         Facial Plastic and Reconstructive Surgery Note    Today I had the pleasure of seeing the Mr. Hernandez at the Facial Plastic and Reconstructive Surgery Clinic in the Department of Otolaryngology at [facility] [place]. He underwent reconstruction several weeks ago. Pain is appropriately controlled and decreasing. No bleeding or discharge from the wound.    On examination, he is in no apparent distress, no stridor, voice is strong.   The flap is viable with adequate color and capillary refill. Incisions are clean, dry, and intact. No evidence of hematoma or infection. The stent was easily removed after the sutures were cut. Intranasal skin graft is present and healthy. Has areas of granulation. No infection. No pressure sores in the nose or lip. The area was cleaned throughly and a new stent was easily replaced.     Ruiz Scott MD           Again, thank you for allowing me to participate in the care of your patient.        Sincerely,        Ruiz Scott MD

## 2023-05-24 NOTE — NURSING NOTE
Kel Hernandez's goals for this visit include:   Chief Complaint   Patient presents with     Surgical Followup     3 week S/P Stage 1 Left nasal reconstruction with wound preparation, local flaps, placement of nasal stent DOS 5-4-23       He requests these members of his care team be copied on today's visit information:     PCP:     Referring Provider:  No referring provider defined for this encounter.    There were no vitals taken for this visit.    Do you need any medication refills at today's visit? No    Jodie Borges RN

## 2023-05-24 NOTE — PATIENT INSTRUCTIONS
Remove stent on your own on Wednesday 5/31/23 in the AM. Wash the stent and place vaseline in the nose and around the stent. Then place the stent back in the nostril. If it goes OK, begin to remove, wash, and place back every other day or more frequently if needed. If it does not go OK, call Dr. Scott at [phone], if your nose is getting sore or other concerns.     Return on 6/22/23 for stent exchange.

## 2023-06-08 NOTE — PROGRESS NOTES
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 Garcia.   -5/4/23: stage 1- superiorly based melolabial flap, intranasal skin graft (donor = flap standing cutaneous deformity), placement of left nasal stent (path = Negative for malignancy).  Medical Decision Making (MDM) (acute complicated injury because of the full-thickness component of his wound): Recovering appropriately without complication. Follow up as outlined below. I encouraged the patient to call or return sooner if questions or problems arise.   Care Checklist:  _Anticipate the need for additional stages of surgery to complete the reconstructive process to include: Stage 2- elevation of the flap, placement of a conchal cartilage alar batten graft from either ear and vestibular stenosis repair. Stage 3- pedicle division and inset. Stage 4- supra alar crease reconstruction because the thickness of the flap is expected to obliterate this facial landmark. And any additional procedures to address flap thickness or scar widening.   _Schedule stage 2 surgery. The intranasal surface has completely healed and we will allow additional time for skin graft maturation.  _Pain 0/10. Pain management: Tylenol.     2.) Left nasal obstruction from the following anatomic causes: loss of nasal support framework after cancer resection  Medical Decision Making (acute complicated injury): The skin cancer involved deep structures of the nose and resection of those structures has destabilized the nasal support framework resulting in left dynamic nasal valve collapse and resulting nasal obstruction. The additional nasal surgery outlined in the care checklist is medically necessary to address the nasal obstruction resulting from resection of the skin cancer.  Given the size of the intranasal wound, he is at significant risk for scar and skin graft  contracture. Nasal stenting is medically necessary to counteract the contractile forces of his scar and skin graft that will reconstruct his mucosal lining. He also has a longer period of intranasal scarring risk resulting in nasal obstruction because cartilage cannot be inserted before his skin graft becomes established. Nasal stent remains in place without signs of complications. I encouraged him to call me anytime if he has concerns or questions.   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   _Continue nasal stent use on the left side. Stent exchange on 5/24/23 and 6/22/23.    _Stent care: remove nasal stent 1-2 times daily and wash with Karen dish soap and water. Can keep stent out up to 1 hour per day twice a day unless it starts to get difficult to put the stent back into your nose. Update 6/22/23: he has been having no problems removing and replacing the stent. Has been removing once a day. Has had it out about 45 minutes per day once a day.   _RTC 6/22/23 for stent exchange.      3.) Comorbidities that increase the risk of surgery: Kidney transplant 15 years ago with immunosuppression, bilateral hip replacement, no NSAIDS because of kidney transplant. Aspirin.   MDM: (stable chronic problem or illness).   We discussed the patients comorbidities listed above increase the risks associated with any procedure and recommendations to mitigate those risks are listed in the care checklist below.   Care Checklist:  _No NSAIDs for postoperative pain because of kidney transplant.   _Anticoagulation plan with nephrologist whether he can stop ASA prior to surgery and how long he can remain off postop. Update 5/4/23: his preop provider approved being off. Last dose of ASA 4/28. I asked him to check with his providers whether he can be off ASA for 1  week postop. Update 6/22/23: restarted Aspirin without a problem. Stop ASA 7 days before surgery.      Background/Connection:   Preferred name = Jose D  What is most important to know about you as a person? (No medical information) Retired. . Planning on [place] time in [place] and [place].      Photographs: [facility] consents signed 4/20/23.    Ruiz Scott MD         Facial Plastic and Reconstructive Surgery Note    Today I had the pleasure of seeing the patient at the Facial Plastic and Reconstructive Surgery Clinic in the Department of Otolaryngology at [facility]. He follows up after undergoing reconstruction. The patient has been doing well since our last visit. No pain, bleeding or discharge from the wound.    Physical examination:   The patient is in no apparent distress, no stridor, voice is strong.   The tissue at the reconstruction site is 100% viable with adequate color and capillary refill. All incisions are clean, dry, and intact. No evidence of hematoma or infection.    No evidence of pressure sores except for a small 2x2mm superficial excoriation where the stent crosses the melolabial crease donor site scar that started this morning. Nothing like that has previously happened per patient. Intranasal surfaces well healed. No pressure sores. Very adequate INV and ENV area on the left.     PREOPERATIVE COUNSELING: An extensive preoperative discussion was held. The patient stated he understood the risks, benefits, alternatives and limitations of the procedure. The risks including but not limited to bleeding, infection, damage to surrounding structures, numbness, weakness, cancer recurrence, chronic pain, poor aesthetic result, partial or total skin loss, distortion of surrounding facial structures, and unforeseen complications related to surgery or anesthesia were described. I emphasized the risks of partial or total skin loss at the surgical sites. He also  understands the possibility of distortion of surrounding facial structures including his nostril margin which may result in nasal obstruction or alar retraction. I discussed the limitations of this procedure in that the donor site will have anticipated scars and complications can occur at the donor site.  He understands that more skin may need to be excised during the reconstruction. We discussed how additional surgeries may be needed to obtain an optimal result.    I described how no reconstructive effort will be able to restore him to his exact precancer condition. We also acknowledged that facial asymmetries will be present after the reconstruction. The patient stated he had his questions answered to his satisfaction.    Ruiz Scott MD

## 2023-06-22 ENCOUNTER — PREP FOR PROCEDURE (OUTPATIENT)
Dept: OTOLARYNGOLOGY | Facility: CLINIC | Age: 63
End: 2023-06-22

## 2023-06-22 ENCOUNTER — OFFICE VISIT (OUTPATIENT)
Dept: OTOLARYNGOLOGY | Facility: CLINIC | Age: 63
End: 2023-06-22
Payer: MEDICARE

## 2023-06-22 VITALS — DIASTOLIC BLOOD PRESSURE: 82 MMHG | SYSTOLIC BLOOD PRESSURE: 163 MMHG | HEART RATE: 59 BPM

## 2023-06-22 DIAGNOSIS — Z98.890 MOHS DEFECT OF LEFT NOSE: Primary | ICD-10-CM

## 2023-06-22 DIAGNOSIS — M95.0 MOHS DEFECT OF LEFT NOSE: Primary | ICD-10-CM

## 2023-06-22 PROCEDURE — 99024 POSTOP FOLLOW-UP VISIT: CPT | Performed by: OTOLARYNGOLOGY

## 2023-06-22 NOTE — NURSING NOTE
Kel Hernandez's chief complaint for this visit includes:  Chief Complaint   Patient presents with     Surgical Followup     4 week S/P Stage 1 Left nasal reconstruction with wound preparation, local flaps, placement of nasal stent DOS 5-4-23     PCP:     Referring Provider:  No referring provider defined for this encounter.    BP (!) 163/82   Pulse 59

## 2023-06-22 NOTE — LETTER
6/22/2023         RE: Kel Hernandez  [address] [facility]  [place] [address]        Dear Colleague,    Thank you for referring your patient, Kel Hernandez, to the [facility]. Please see a copy of my visit note below.    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 Garcia.   -5/4/23: stage 1- superiorly based melolabial flap, intranasal skin graft (donor = flap standing cutaneous deformity), placement of left nasal stent (path = Negative for malignancy).  Medical Decision Making (MDM) (acute complicated injury because of the full-thickness component of his wound): Recovering appropriately without complication. Follow up as outlined below. I encouraged the patient to call or return sooner if questions or problems arise.   Care Checklist:  _Anticipate the need for additional stages of surgery to complete the reconstructive process to include: Stage 2- elevation of the flap, placement of a conchal cartilage alar batten graft from either ear and vestibular stenosis repair. Stage 3- pedicle division and inset. Stage 4- supra alar crease reconstruction because the thickness of the flap is expected to obliterate this facial landmark. And any additional procedures to address flap thickness or scar widening.   _Schedule stage 2 surgery. The intranasal surface has completely healed and we will allow additional time for skin graft maturation.  _Pain 0/10. Pain management: Tylenol.     2.) Left nasal obstruction from the following anatomic causes: loss of nasal support framework after cancer resection  Medical Decision Making (acute complicated injury): The skin cancer involved deep structures of the nose and resection of those structures has destabilized the nasal support framework resulting in left dynamic nasal valve collapse and resulting nasal obstruction. The  additional nasal surgery outlined in the care checklist is medically necessary to address the nasal obstruction resulting from resection of the skin cancer.  Given the size of the intranasal wound, he is at significant risk for scar and skin graft contracture. Nasal stenting is medically necessary to counteract the contractile forces of his scar and skin graft that will reconstruct his mucosal lining. He also has a longer period of intranasal scarring risk resulting in nasal obstruction because cartilage cannot be inserted before his skin graft becomes established. Nasal stent remains in place without signs of complications. I encouraged him to call me anytime if he has concerns or questions.   Care Checklist:  _Additional components of the surgical plan to address the nasal obstruction include: vestibular stenosis repair (CPT 06169) with a conchal cartilage alar batten graft during the second stage of surgery.   _Continue nasal stent use on the left side. Stent exchange on 5/24/23 and 6/22/23.    _Stent care: remove nasal stent 1-2 times daily and wash with Karen dish soap and water. Can keep stent out up to 1 hour per day twice a day unless it starts to get difficult to put the stent back into your nose. Update 6/22/23: he has been having no problems removing and replacing the stent. Has been removing once a day. Has had it out about 45 minutes per day once a day.   _RTC 6/22/23 for stent exchange.      3.) Comorbidities that increase the risk of surgery: Kidney transplant 15 years ago with immunosuppression, bilateral hip replacement, no NSAIDS because of kidney transplant. Aspirin.   MDM: (stable chronic problem or illness).   We discussed the patients comorbidities listed above increase the risks associated with any procedure and recommendations to mitigate those risks are listed in the care checklist below.   Care Checklist:  _No NSAIDs for postoperative pain because of kidney transplant.   _Anticoagulation  plan with nephrologist whether he can stop ASA prior to surgery and how long he can remain off postop. Update 5/4/23: his preop provider approved being off. Last dose of ASA 4/28. I asked him to check with his providers whether he can be off ASA for 1 week postop. Update 6/22/23: restarted Aspirin without a problem. Stop ASA 7 days before surgery.      Background/Connection:   Preferred name = Jose D  What is most important to know about you as a person? (No medical information) Retired. . Planning on [place] time in [place] and [place].      Photographs: [facility] consents signed 4/20/23.    Ruiz Scott MD         Facial Plastic and Reconstructive Surgery Note    Today I had the pleasure of seeing the patient at the Facial Plastic and Reconstructive Surgery Clinic in the Department of Otolaryngology at [facility]. He follows up after undergoing reconstruction. The patient has been doing well since our last visit. No pain, bleeding or discharge from the wound.    Physical examination:   The patient is in no apparent distress, no stridor, voice is strong.   The tissue at the reconstruction site is 100% viable with adequate color and capillary refill. All incisions are clean, dry, and intact. No evidence of hematoma or infection.    No evidence of pressure sores except for a small 2x2mm superficial excoriation where the stent crosses the melolabial crease donor site scar that started this morning. Nothing like that has previously happened per patient. Intranasal surfaces well healed. No pressure sores. Very adequate INV and ENV area on the left.     PREOPERATIVE COUNSELING: An extensive preoperative discussion was held. The patient stated he understood the risks, benefits, alternatives and limitations of the procedure. The risks including but not limited to bleeding, infection, damage to surrounding structures, numbness, weakness, cancer recurrence, chronic pain, poor aesthetic  result, partial or total skin loss, distortion of surrounding facial structures, and unforeseen complications related to surgery or anesthesia were described. I emphasized the risks of partial or total skin loss at the surgical sites. He also understands the possibility of distortion of surrounding facial structures including his nostril margin which may result in nasal obstruction or alar retraction. I discussed the limitations of this procedure in that the donor site will have anticipated scars and complications can occur at the donor site.  He understands that more skin may need to be excised during the reconstruction. We discussed how additional surgeries may be needed to obtain an optimal result.    I described how no reconstructive effort will be able to restore him to his exact precancer condition. We also acknowledged that facial asymmetries will be present after the reconstruction. The patient stated he had his questions answered to his satisfaction.    Ruiz Scott MD           Again, thank you for allowing me to participate in the care of your patient.        Sincerely,        Ruiz Scott MD

## 2023-06-29 ENCOUNTER — HOSPITAL ENCOUNTER (OUTPATIENT)
Facility: AMBULATORY SURGERY CENTER | Age: 63
End: 2023-06-29
Attending: OTOLARYNGOLOGY | Admitting: OTOLARYNGOLOGY
Payer: MEDICARE

## 2023-06-29 ENCOUNTER — TELEPHONE (OUTPATIENT)
Dept: OTOLARYNGOLOGY | Facility: CLINIC | Age: 63
End: 2023-06-29

## 2023-06-29 NOTE — TELEPHONE ENCOUNTER
Message left for the patient to call back to get surgery scheduled with Dr. Scott.    Niyah Rodrigues  Surgery Scheduling Coordinator  Ph: [phone]

## 2023-07-17 ENCOUNTER — OFFICE VISIT (OUTPATIENT)
Dept: FAMILY MEDICINE | Facility: CLINIC | Age: 63
End: 2023-07-17
Payer: MEDICARE

## 2023-07-17 VITALS
SYSTOLIC BLOOD PRESSURE: 134 MMHG | DIASTOLIC BLOOD PRESSURE: 80 MMHG | RESPIRATION RATE: 18 BRPM | TEMPERATURE: 97.4 F | OXYGEN SATURATION: 96 % | HEART RATE: 58 BPM | WEIGHT: 246 LBS | BODY MASS INDEX: 36.43 KG/M2 | HEIGHT: 69 IN

## 2023-07-17 DIAGNOSIS — Z01.818 PRE-OP EXAM: Primary | ICD-10-CM

## 2023-07-17 DIAGNOSIS — S09.92XD INJURY OF NOSE, SUBSEQUENT ENCOUNTER: ICD-10-CM

## 2023-07-17 DIAGNOSIS — Z00.00 ENCOUNTER FOR MEDICARE ANNUAL WELLNESS EXAM: ICD-10-CM

## 2023-07-17 DIAGNOSIS — Z13.1 SCREENING FOR DIABETES MELLITUS: ICD-10-CM

## 2023-07-17 DIAGNOSIS — Z12.11 SCREEN FOR COLON CANCER: ICD-10-CM

## 2023-07-17 DIAGNOSIS — D64.9 LOW HEMOGLOBIN: ICD-10-CM

## 2023-07-17 DIAGNOSIS — R79.9 ABNORMAL FINDING OF BLOOD CHEMISTRY, UNSPECIFIED: ICD-10-CM

## 2023-07-17 DIAGNOSIS — Z00.00 MEDICARE ANNUAL WELLNESS VISIT, SUBSEQUENT: ICD-10-CM

## 2023-07-17 DIAGNOSIS — Z12.5 ENCOUNTER FOR SCREENING FOR MALIGNANT NEOPLASM OF PROSTATE: ICD-10-CM

## 2023-07-17 DIAGNOSIS — N25.81 SECONDARY HYPERPARATHYROIDISM (H): ICD-10-CM

## 2023-07-17 LAB
ANION GAP SERPL CALCULATED.3IONS-SCNC: 10 MMOL/L (ref 7–15)
BUN SERPL-MCNC: 48.4 MG/DL (ref 8–23)
CALCIUM SERPL-MCNC: 9.1 MG/DL (ref 8.8–10.2)
CHLORIDE SERPL-SCNC: 102 MMOL/L (ref 98–107)
CHOLEST SERPL-MCNC: 122 MG/DL
CREAT SERPL-MCNC: 3.24 MG/DL (ref 0.67–1.17)
DEPRECATED HCO3 PLAS-SCNC: 25 MMOL/L (ref 22–29)
ERYTHROCYTE [DISTWIDTH] IN BLOOD BY AUTOMATED COUNT: 12.9 % (ref 10–15)
FERRITIN SERPL-MCNC: 255 NG/ML (ref 31–409)
GFR SERPL CREATININE-BSD FRML MDRD: 20 ML/MIN/1.73M2
GLUCOSE SERPL-MCNC: 176 MG/DL (ref 70–99)
HCT VFR BLD AUTO: 32.5 % (ref 40–53)
HDLC SERPL-MCNC: 57 MG/DL
HGB BLD-MCNC: 10 G/DL (ref 13.3–17.7)
IRON BINDING CAPACITY (ROCHE): 258 UG/DL (ref 240–430)
IRON SATN MFR SERPL: 31 % (ref 15–46)
IRON SERPL-MCNC: 79 UG/DL (ref 61–157)
LDLC SERPL CALC-MCNC: 40 MG/DL
MCH RBC QN AUTO: 28.8 PG (ref 26.5–33)
MCHC RBC AUTO-ENTMCNC: 30.8 G/DL (ref 31.5–36.5)
MCV RBC AUTO: 94 FL (ref 78–100)
NONHDLC SERPL-MCNC: 65 MG/DL
PLATELET # BLD AUTO: 161 10E3/UL (ref 150–450)
POTASSIUM SERPL-SCNC: 4.5 MMOL/L (ref 3.4–5.3)
PSA SERPL DL<=0.01 NG/ML-MCNC: 1.1 NG/ML (ref 0–4.5)
RBC # BLD AUTO: 3.47 10E6/UL (ref 4.4–5.9)
SODIUM SERPL-SCNC: 137 MMOL/L (ref 136–145)
TRIGL SERPL-MCNC: 125 MG/DL
WBC # BLD AUTO: 7 10E3/UL (ref 4–11)

## 2023-07-17 PROCEDURE — 83540 ASSAY OF IRON: CPT | Performed by: NURSE PRACTITIONER

## 2023-07-17 PROCEDURE — 80048 BASIC METABOLIC PNL TOTAL CA: CPT | Performed by: NURSE PRACTITIONER

## 2023-07-17 PROCEDURE — 36415 COLL VENOUS BLD VENIPUNCTURE: CPT | Performed by: NURSE PRACTITIONER

## 2023-07-17 PROCEDURE — 82728 ASSAY OF FERRITIN: CPT | Performed by: NURSE PRACTITIONER

## 2023-07-17 PROCEDURE — 99215 OFFICE O/P EST HI 40 MIN: CPT | Mod: 25 | Performed by: NURSE PRACTITIONER

## 2023-07-17 PROCEDURE — G0103 PSA SCREENING: HCPCS | Performed by: NURSE PRACTITIONER

## 2023-07-17 PROCEDURE — G0438 PPPS, INITIAL VISIT: HCPCS | Performed by: NURSE PRACTITIONER

## 2023-07-17 PROCEDURE — 83036 HEMOGLOBIN GLYCOSYLATED A1C: CPT | Performed by: NURSE PRACTITIONER

## 2023-07-17 PROCEDURE — 80061 LIPID PANEL: CPT | Performed by: NURSE PRACTITIONER

## 2023-07-17 PROCEDURE — 85027 COMPLETE CBC AUTOMATED: CPT | Performed by: NURSE PRACTITIONER

## 2023-07-17 PROCEDURE — 83550 IRON BINDING TEST: CPT | Performed by: NURSE PRACTITIONER

## 2023-07-17 ASSESSMENT — PAIN SCALES - GENERAL: PAINLEVEL: NO PAIN (0)

## 2023-07-17 NOTE — PATIENT INSTRUCTIONS
Preventive Health Recommendations  See your health care provider every year to    Review health changes.     Discuss preventive care.      Review your medicines if your doctor has prescribed any.    Talk with your health care provider about whether you should have a test to screen for prostate cancer (PSA).    Every 3 years, have a diabetes test (fasting glucose). If you are at risk for diabetes, you should have this test more often.    Every 5 years, have a cholesterol test. Have this test more often if you are at risk for high cholesterol or heart disease.     Every 10 years, have a colonoscopy. Or, have a yearly FIT test (stool test). These exams will check for colon cancer.    Talk to with your health care provider about screening for Abdominal Aortic Aneurysm if you have a family history of AAA or have a history of smoking.    Shots:     Get a flu shot each year.     Get a tetanus shot every 10 years.     Talk to your doctor about your pneumonia vaccines. There are now two you should receive - Pneumovax (PPSV 23) and Prevnar (PCV 13).    Talk to your pharmacist about a shingles vaccine.     Talk to your doctor about the hepatitis B vaccine.    Nutrition:     Eat at least 5 servings of fruits and vegetables each day.     Eat whole-grain bread, whole-wheat pasta and brown rice instead of white grains and rice.     Get adequate Calcium and Vitamin D.     Lifestyle    Exercise for at least 150 minutes a week (30 minutes a day, 5 days a week). This will help you control your weight and prevent disease.     Limit alcohol to one drink per day.     No smoking.     Wear sunscreen to prevent skin cancer.     See your dentist every six months for an exam and cleaning.     See your eye doctor every 1 to 2 years to screen for conditions such as glaucoma, macular degeneration and cataracts.    Personalized Prevention Plan  You are due for the preventive services outlined below.  Your care team is available to assist you in  scheduling these services.  If you have already completed any of these items, please share that information with your care team to update in your medical record.  Health Maintenance   Topic Date Due     ANNUAL REVIEW OF HM ORDERS  Never done     ADVANCE CARE PLANNING  Never done     COLORECTAL CANCER SCREENING  Never done     HIV SCREENING  Never done     HEPATITIS C SCREENING  Never done     LIPID  Never done     HEPATITIS B IMMUNIZATION (3 of 3 - Risk 3-dose series) 04/05/2007     DTAP/TDAP/TD IMMUNIZATION (3 - Td or Tdap) 05/26/2019     MEDICARE ANNUAL WELLNESS VISIT  Never done     AORTIC ANEURYSM SCREENING (SYSTEM ASSIGNED)  Never done     Pneumococcal Vaccine: 65+ Years (4 - PPSV23 if available, else PCV20) 06/13/2023     INFLUENZA VACCINE (1) 09/01/2023     FALL RISK ASSESSMENT  07/17/2024     PHQ-2 (once per calendar year)  Completed     ZOSTER IMMUNIZATION  Completed     COVID-19 Vaccine  Completed     IPV IMMUNIZATION  Aged Out     MENINGITIS IMMUNIZATION  Aged Out      Patient Education   Personalized Prevention Plan  You are due for the preventive services outlined below.  Your care team is available to assist you in scheduling these services.  If you have already completed any of these items, please share that information with your care team to update in your medical record.  Health Maintenance Due   Topic Date Due     ANNUAL REVIEW OF HM ORDERS  Never done     Colorectal Cancer Screening  Never done     HIV Screening  Never done     Hepatitis C Screening  Never done     Hepatitis B Vaccine (3 of 3 - Risk 3-dose series) 04/05/2007     Diptheria Tetanus Pertussis (DTAP/TDAP/TD) Vaccine (3 - Td or Tdap) 05/26/2019     Annual Wellness Visit  Never done     AORTIC ANEURYSM SCREENING (SYSTEM ASSIGNED)  Never done     Pneumococcal Vaccine (4 - PPSV23 if available, else PCV20) 06/13/2023       Understanding USDA MyPlate  The USDA has guidelines to help you make healthy food choices. These are called MyPlate.  MyPlate shows the food groups that make up healthy meals using the image of a place setting. Before you eat, think about the healthiest choices for what to put on your plate or in your cup or bowl. To learn more about building a healthy plate, visit www.[facility].gov.     The food groups    Fruits. Any fruit or 100% fruit juice counts as part of the Fruit Group. Fruits may be fresh, canned, frozen, or dried, and may be whole, cut-up, or pureed. Make 1/2 of your plate fruits and vegetables.    Vegetables. Any vegetable or 100% vegetable juice counts as a member of the Vegetable Group. Vegetables may be fresh, frozen, canned, or dried. They can be served raw or cooked and may be whole, cut-up, or mashed. Make 1/2 of your plate fruits and vegetables.    Grains. All foods made from grains are part of the Grains Group. These include wheat, rice, oats, cornmeal, and barley. Grains are often used to make foods such as bread, pasta, oatmeal, cereal, tortillas, and grits. Grains should be no more than 1/4 of your plate. At least half of your grains should be whole grains.    Protein. This group includes meat, poultry, seafood, beans and peas, eggs, processed soy products (such as tofu), nuts (including nut butters), and seeds. Make protein choices no more than 1/4 of your plate. Meat and poultry choices should be lean or low fat.    Dairy. The Dairy Group includes all fluid milk products and foods made from milk that contain calcium, such as yogurt and cheese. (Foods that have little calcium, such as cream, butter, and cream cheese, are not part of this group.) Most dairy choices should be low-fat or fat-free.  Things to limit  Eating healthy also means limiting these things in your diet:    Oils. Oils aren't a food group, but they do contain essential nutrients. These are fats that are liquid at room temperature. Including small amounts of oils in your diet is fine and can even be good for you. But it's important to watch how  much oil you include in your diet. Oils include avocado, canola, corn, olive, soybean, vegetable, and sunflower. Foods that are mainly oil include mayonnaise, certain salad dressings, and soft margarines. You likely already get your daily oil allowance from the foods you eat.    Salt (sodium).  Many processed foods have a lot of sodium. To keep sodium intake down, eat fresh vegetables, meats, poultry, and seafood when possible. Buy low-sodium, reduced-sodium, or no-salt-added food products at the store. And don't add salt to your meals at home. Instead, season them with herbs and spices such as dill, oregano, cumin, and paprika. Or try adding flavor with vinegar, onion, garlic, or lemon or lime zest and juice.    Saturated fat . Saturated fats are most often found in animal products such as beef, pork, and chicken. They are often solid at room temperature, such as butter. To reduce your saturated fat intake, choose leaner cuts of meat and poultry. And try healthier cooking methods such as grilling, broiling, roasting, or baking. For a simple lower-fat swap, use plain nonfat yogurt instead of mayonnaise when making potato salad or macaroni salad.    Added sugars.  These are sugars added to foods. They are in foods such as ice cream, candy, soda, fruit drinks, sports drinks, energy drinks, cookies, pastries, jams, and syrups. Cut down on added sugars by sharing sweet treats with a family member or friend. You can also choose fruit for dessert, and drink water or other unsweetened beverages.    Alcohol. Alcohol contains calories but few nutrients. If you don't drink alcohol, don't start drinking for any reason. But if you do choose to drink alcohol, do so only in moderation. This means no more than 2 drinks in a day for men and no more than 1 drink per day for women, on days when you have alcohol.  [employer] last reviewed this educational content on 1/1/2023 2000-2023 [employer]. All rights reserved.  This information is not intended as a substitute for professional medical care. Always follow your healthcare professional's instructions.

## 2023-07-17 NOTE — PROGRESS NOTES
[facility]  [address] [place] [id number]  Phone: [phone]  Fax: [phone]  Primary Provider:   Pre-op Performing Provider: LUZ MUHAMMAD      PREOPERATIVE EVALUATION:  Today's date: 7/17/2023    Kel Hernandez is a 63 year old male who presents for a preoperative evaluation.      7/17/2023     8:33 AM   Additional Questions   Roomed by Shayy LOREDO     Surgical Information:  Surgery/Procedure: Stage 2 Left nasal reconstruction with wound preparation, local flaps, full thickness skin graft from either neck or postauricualar skin, forehead flap, complex closure, ear cartilage graft from either ear, lateral nasal wall reconstruction, vestibular stenosis repair  Surgery Location: [facility]  Surgeon: Ruiz Scott MD  Surgery Date: 7/26/23  Time of Surgery:   Where patient plans to recover: At home with family  Fax number for surgical facility: Note does not need to be faxed, will be available electronically in Epic.    Assessment & Plan     The proposed surgical procedure is considered INTERMEDIATE risk.    (Z00.00) Encounter for Medicare annual wellness exam  (primary encounter diagnosis)  Comment:   Plan:     (Z00.00) Medicare annual wellness visit, subsequent  Comment:   Plan: Lipid panel reflex to direct LDL Fasting, PSA,         screen            (Z01.818) Pre-op exam  Comment:   Plan: CBC with platelets, Basic metabolic panel  (Ca,        Cl, CO2, Creat, Gluc, K, Na, BUN), OFFICE/OUTPT        VISIT,EST,LEVL IV          (S09.92XD) Injury of nose, subsequent encounter  Comment:   Plan:     (Z12.11) Screen for colon cancer  Comment:   Plan:     (Z12.5) Encounter for screening for malignant neoplasm of prostate  Comment:   Plan: PSA, screen            (D64.9) Low hemoglobin  Comment:   Plan: Iron and iron binding capacity, Ferritin,         OFFICE/OUTPT VISIT,EST,LEVL IV            (N25.81) Secondary hyperparathyroidism  (H)  Comment:   Plan:            In addition to his wellness visit an additional 50 minutes spent by me on the date of the encounter doing chart review, history and exam, documentation and further activities per the note and review with other provider and transplant center         Risks and Recommendations:  The patient has the following additional risks and recommendations for perioperative complications:   - Consult Hospitalist / IM to assist with post-op medical management    Antiplatelet or Anticoagulation Medication Instructions:   - aspirin: Discontinue aspirin 7-10 days prior to procedure to reduce bleeding risk. It should be resumed postoperatively.     Additional Medication Instructions:  Patient is to take all scheduled medications on the day of surgery    RECOMMENDATION:  Unable to get creatinine below 3 reviewed with transplant center recommend no surgery at this time he will be seen in follow-up with the transplant center patient does have a globin A1c of 6.4 also reviewed with patient will need a follow-up visit patient is complaining will establish care with a primary care provider in [place] and have follow-up on his hemoglobin A1c and will have follow-up on his creatinine with the kidney center.  Did inform Ruiz Scott MD inability to do surgery at this time.    8/4/2023 patient did have follow-up with transplant nephrology they are recommending patient be placed back on the transplant list due to chronic rejection.  See transfer plan note from 8 1 they do feel patient would be able to move forward with his previously scheduled Mohs surgery based on his current creatinine level.  Approval given for surgery just recommend with current creatinine clearance working with anesthesia.    Approval given for surgery       50 minutes spent by me on the date of the encounter doing chart review, review of outside records, review of test results, interpretation of tests, patient visit, documentation,  discussion with other provider(s), and discussion with family       Subjective       HPI related to upcoming procedure: Stage 2 Left nasal reconstruction with wound preparation, local flaps, full thickness skin graft from either neck or postauricualar skin, forehead flap, complex closure, ear cartilage graft from either ear, lateral nasal wall reconstruction, vestibular stenosis repair          7/10/2023    11:08 AM   Preop Questions   1. Have you ever had a heart attack or stroke? No   2. Have you ever had surgery on your heart or blood vessels, such as a stent placement, a coronary artery bypass, or surgery on an artery in your head, neck, heart, or legs? No   3. Do you have chest pain with activity? No   4. Do you have a history of  heart failure? No   5. Do you currently have a cold, bronchitis or symptoms of other infection? No   6. Do you have a cough, shortness of breath, or wheezing? No   7. Do you or anyone in your family have previous history of blood clots? No   8. Do you or does anyone in your family have a serious bleeding problem such as prolonged bleeding following surgeries or cuts? No   9. Have you ever had problems with anemia or been told to take iron pills? No   10. Have you had any abnormal blood loss such as black, tarry or bloody stools? No   11. Have you ever had a blood transfusion? No   12. Are you willing to have a blood transfusion if it is medically needed before, during, or after your surgery? Yes   13. Have you or any of your relatives ever had problems with anesthesia? No   14. Do you have sleep apnea, excessive snoring or daytime drowsiness? No   15. Do you have any artifical heart valves or other implanted medical devices like a pacemaker, defibrillator, or continuous glucose monitor? No   16. Do you have artificial joints? YES    17. Are you allergic to latex? No       Health Care Directive:  Patient does not have a Health Care Directive or Living Will: Discussed advance care  planning with patient; however, patient declined at this time.    Preoperative Review of :   reviewed - no record of controlled substances prescribed.      Status of Chronic Conditions:  See problem list for active medical problems.  Problems all longstanding and stable, except as noted/documented.  See ROS for pertinent symptoms related to these conditions.      Review of Systems  Constitutional, neuro, ENT, endocrine, pulmonary, cardiac, gastrointestinal, genitourinary, musculoskeletal, integument and psychiatric systems are negative, except as otherwise noted.    Patient Active Problem List    Diagnosis Date Noted    Mohs defect of left nose 04/20/2023     Priority: Medium     Added automatically from request for surgery [id number]        Past Medical History:   Diagnosis Date    Cancer (H) Skin Cancer on nose    Hypertension 15years ago after transplant    Renal disease      Past Surgical History:   Procedure Laterality Date    COLONOSCOPY  2019    ORTHOPEDIC SURGERY Bilateral     hip replacements    REPAIR MOHS Left 5/4/2023    Procedure: Stage 1 Left nasal reconstruction with wound preparation, local flaps, placement of nasal stent.;  Surgeon: Ruiz Scott MD;  Location: [facility] OR    TRANSPLANT Left 2011    kidney     Current Outpatient Medications   Medication Sig Dispense Refill    allopurinol (ZYLOPRIM) 100 MG tablet       aspirin 81 MG EC tablet Take 81 mg by mouth daily      hydrochlorothiazide (HYDRODIURIL) 12.5 MG tablet       lisinopril (ZESTRIL) 20 MG tablet       LOKELMA 10 g PACK packet       metoprolol tartrate (LOPRESSOR) 25 MG tablet       mycophenolate (GENERIC EQUIVALENT) 500 MG tablet       pravastatin (PRAVACHOL) 40 MG tablet       predniSONE (DELTASONE) 5 MG tablet       sodium bicarbonate 325 MG tablet Take by mouth 4 times daily      tacrolimus (GENERIC EQUIVALENT) 0.5 MG capsule       Vitamin D3 (VITAMIN D, CHOLECALCIFEROL,) 25 mcg (1000 units) tablet Take by mouth daily    "      Allergies   Allergen Reactions    Nsaids      PT HAS HAD A KIDNEY TRANSPLANT        Social History     Tobacco Use    Smoking status: Never    Smokeless tobacco: Never   Substance Use Topics    Alcohol use: Not Currently     Comment: mild. 1/2 weeks     Family History   Problem Relation Age of Onset    Other Cancer Mother         Lung Cancer     History   Drug Use Unknown         Objective     /80 (BP Location: Right arm, Cuff Size: Adult Large)   Pulse 58   Temp 97.4  F (36.3  C) (Tympanic)   Resp 18   Ht 1.746 m (5' 8.75\")   Wt 111.6 kg (246 lb)   SpO2 96%   BMI 36.59 kg/m      Physical Exam    GENERAL APPEARANCE: healthy, alert and no distress     EYES: EOMI, PERRL     HENT: ear canals and TM's normal and nose and mouth without ulcers or lesions     NECK: no adenopathy, no asymmetry, masses, or scars and thyroid normal to palpation     RESP: lungs clear to auscultation - no rales, rhonchi or wheezes     CV: regular rates and rhythm, normal S1 S2, no S3 or S4 and no murmur, click or rub     ABDOMEN:  soft, nontender, no HSM or masses and bowel sounds normal     MS: extremities normal- no gross deformities noted, no evidence of inflammation in joints, FROM in all extremities.     SKIN: no suspicious lesions or rashes     NEURO: Normal strength and tone, sensory exam grossly normal, mentation intact and speech normal     PSYCH: mentation appears normal. and affect normal/bright     LYMPHATICS: No cervical adenopathy    No results for input(s): HGB, PLT, INR, NA, POTASSIUM, CR, A1C in the last 60334 hours.   Results for orders placed or performed in visit on 07/17/23   PSA, screen     Status: Normal   Result Value Ref Range    Prostate Specific Antigen Screen 1.10 0.00 - 4.50 ng/mL    Narrative    This result is obtained using the Roche Elecsys total PSA method on the solo [id number] immunoassay analyzer. Results obtained with different assay methods or kits cannot be used interchangeably.   Lipid panel " reflex to direct LDL Fasting     Status: Normal   Result Value Ref Range    Cholesterol 122 <200 mg/dL    Triglycerides 125 <150 mg/dL    Direct Measure HDL 57 >=40 mg/dL    LDL Cholesterol Calculated 40 <=100 mg/dL    Non HDL Cholesterol 65 <130 mg/dL    Narrative    Cholesterol  Desirable:  <200 mg/dL    Triglycerides  Normal:  Less than 150 mg/dL  Borderline High:  150-199 mg/dL  High:  200-499 mg/dL  Very High:  Greater than or equal to 500 mg/dL    Direct Measure HDL  Female:  Greater than or equal to 50 mg/dL   Male:  Greater than or equal to 40 mg/dL    LDL Cholesterol  Desirable:  <100mg/dL  Above Desirable:  100-129 mg/dL   Borderline High:  130-159 mg/dL   High:  160-189 mg/dL   Very High:  >= 190 mg/dL    Non HDL Cholesterol  Desirable:  130 mg/dL  Above Desirable:  130-159 mg/dL  Borderline High:  160-189 mg/dL  High:  190-219 mg/dL  Very High:  Greater than or equal to 220 mg/dL   Basic metabolic panel  (Ca, Cl, CO2, Creat, Gluc, K, Na, BUN)     Status: Abnormal   Result Value Ref Range    Sodium 137 136 - 145 mmol/L    Potassium 4.5 3.4 - 5.3 mmol/L    Chloride 102 98 - 107 mmol/L    Carbon Dioxide (CO2) 25 22 - 29 mmol/L    Anion Gap 10 7 - 15 mmol/L    Urea Nitrogen 48.4 (H) 8.0 - 23.0 mg/dL    Creatinine 3.24 (H) 0.67 - 1.17 mg/dL    Calcium 9.1 8.8 - 10.2 mg/dL    Glucose 176 (H) 70 - 99 mg/dL    GFR Estimate 20 (L) >60 mL/min/1.73m2   CBC with platelets     Status: Abnormal   Result Value Ref Range    WBC Count 7.0 4.0 - 11.0 10e3/uL    RBC Count 3.47 (L) 4.40 - 5.90 10e6/uL    Hemoglobin 10.0 (L) 13.3 - 17.7 g/dL    Hematocrit 32.5 (L) 40.0 - 53.0 %    MCV 94 78 - 100 fL    MCH 28.8 26.5 - 33.0 pg    MCHC 30.8 (L) 31.5 - 36.5 g/dL    RDW 12.9 10.0 - 15.0 %    Platelet Count 161 150 - 450 10e3/uL   Iron and iron binding capacity     Status: Normal   Result Value Ref Range    Iron 79 61 - 157 ug/dL    Iron Binding Capacity 258 240 - 430 ug/dL    Iron Sat Index 31 15 - 46 %   Ferritin     Status:  "Normal   Result Value Ref Range    Ferritin 255 31 - 409 ng/mL     Patient is post kidney transplant he hemoglobin  at is at his baseline 10 and creatinine is at elevated  at 3.2  baseline is 2.6 due to his kidney transplant  Diagnostics:     EKG: sinus bradycardia, normal axis, normal intervals, no acute ST/T changes c/w ischemia, no LVH by voltage criteria, unchanged from previous tracings 4/2023    Revised Cardiac Risk Index (RCRI):  The patient has the following serious cardiovascular risks for perioperative complications:   - No serious cardiac risks = 0 points     RCRI Interpretation: 0 points: Class I (very low risk - 0.4% complication rate)           Signed Electronically by: SANCHEZ Higgins CNP  Copy of this evaluation report is provided to requesting physician.        Annual Wellness Visit    Patient has been advised of split billing requirements and indicates understanding: Yes     Are you in the first 12 months of your Medicare Part B coverage?  Yes,  Visual Acuity:  Right Eye: 20/125   Left Eye: 20/125  Both Eyes: 20/63    Physical Health:  In general, how would you rate your overall physical health? good  Outside of work, how many days during the week do you exercise?1 day/week  Outside of work, approximately how many minutes a day do you exercise?15-30 minutes  If you drink alcohol do you typically have >3 drinks per day or >7 drinks per week? No  Do you usually eat at least 4 servings of fruit and vegetables a day, include whole grains & fiber and avoid regularly eating high fat or \"junk\" foods? No  Do you have any problems taking medications regularly? No  Do you have any side effects from medications? none  Needs assistance for the following daily activities: no assistance needed  Which of the following safety concerns are present in your home?  lack of grab bars in the bathroom   Hearing impairment: No  In the past 6 months, have you been bothered by leaking of urine? no    Mental " Health:  In general, how would you rate your overall mental or emotional health? fair  PHQ-2 Score: 0    Do you feel safe in your environment? Yes    Have you ever done Advance Care Planning? (For example, a Health Directive, POLST, or a discussion with a medical provider or your loved ones about your wishes)?     Fall risk:  Fallen 2 or more times in the past year?: No  Any fall with injury in the past year?: No    Cognitive Screenin) Repeat 3 items (Leader, Season, Table)    2) Clock draw: NORMAL  3) 3 item recall: Recalls 2 objects   Results: NORMAL clock, 1-2 items recalled: COGNITIVE IMPAIRMENT LESS LIKELY    Mini-CogTM Copyright S Yessi. Licensed by the author for use in [facility]; reprinted with permission (mae@[facility]). All rights reserved.      Do you have sleep apnea, excessive snoring or daytime drowsiness?: no    Social History     Tobacco Use    Smoking status: Never    Smokeless tobacco: Never   Substance Use Topics    Alcohol use: Not Currently     Comment: mild. 1/2 weeks              No data to display              Do you have a current opioid prescription? No  Do you use any other controlled substances or medications that are not prescribed by a provider? None    Current providers sharing in care for this patient include:   Patient Care Team:  Naima Delaney APRN CNP as Assigned PCP  Ruiz Scott MD as Assigned Surgical Provider    Patient has been advised of split billing requirements and indicates understanding: Yes  I have reviewed Opioid Use Disorder and Substance Use Disorder risk factors and made any needed referrals.       The patient was counseled and encouraged to consider modifying their diet and eating habits. He was provided with information on recommended healthy diet options.

## 2023-07-19 PROBLEM — N25.81 SECONDARY HYPERPARATHYROIDISM (H): Status: ACTIVE | Noted: 2023-07-19

## 2023-07-19 LAB — HBA1C MFR BLD: 6.4 % (ref 0–5.6)

## 2023-07-21 RX ORDER — SODIUM CHLORIDE, SODIUM LACTATE, POTASSIUM CHLORIDE, CALCIUM CHLORIDE 600; 310; 30; 20 MG/100ML; MG/100ML; MG/100ML; MG/100ML
INJECTION, SOLUTION INTRAVENOUS CONTINUOUS
Status: CANCELLED | OUTPATIENT
Start: 2023-07-21

## 2023-07-21 RX ORDER — ONDANSETRON 2 MG/ML
4 INJECTION INTRAMUSCULAR; INTRAVENOUS EVERY 30 MIN PRN
Status: CANCELLED | OUTPATIENT
Start: 2023-07-21

## 2023-07-21 RX ORDER — ACETAMINOPHEN 325 MG/1
975 TABLET ORAL ONCE
Status: CANCELLED | OUTPATIENT
Start: 2023-07-21 | End: 2023-07-21

## 2023-07-21 RX ORDER — DEXAMETHASONE SODIUM PHOSPHATE 4 MG/ML
4 INJECTION, SOLUTION INTRA-ARTICULAR; INTRALESIONAL; INTRAMUSCULAR; INTRAVENOUS; SOFT TISSUE
Status: CANCELLED | OUTPATIENT
Start: 2023-07-21

## 2023-07-21 RX ORDER — FENTANYL CITRATE 50 UG/ML
25 INJECTION, SOLUTION INTRAMUSCULAR; INTRAVENOUS EVERY 5 MIN PRN
Status: CANCELLED | OUTPATIENT
Start: 2023-07-21

## 2023-07-21 RX ORDER — DIMENHYDRINATE 50 MG/ML
25 INJECTION, SOLUTION INTRAMUSCULAR; INTRAVENOUS
Status: CANCELLED | OUTPATIENT
Start: 2023-07-21

## 2023-07-21 RX ORDER — MEPERIDINE HYDROCHLORIDE 25 MG/ML
12.5 INJECTION INTRAMUSCULAR; INTRAVENOUS; SUBCUTANEOUS EVERY 5 MIN PRN
Status: CANCELLED | OUTPATIENT
Start: 2023-07-21

## 2023-07-21 RX ORDER — FENTANYL CITRATE 50 UG/ML
25 INJECTION, SOLUTION INTRAMUSCULAR; INTRAVENOUS
Status: CANCELLED | OUTPATIENT
Start: 2023-07-21

## 2023-07-21 RX ORDER — HYDROXYZINE HYDROCHLORIDE 10 MG/1
10 TABLET, FILM COATED ORAL EVERY 6 HOURS PRN
Status: CANCELLED | OUTPATIENT
Start: 2023-07-21

## 2023-07-21 RX ORDER — HYDRALAZINE HYDROCHLORIDE 20 MG/ML
2.5-5 INJECTION INTRAMUSCULAR; INTRAVENOUS EVERY 10 MIN PRN
Status: CANCELLED | OUTPATIENT
Start: 2023-07-21

## 2023-07-21 RX ORDER — OXYCODONE HYDROCHLORIDE 5 MG/1
10 TABLET ORAL
Status: CANCELLED | OUTPATIENT
Start: 2023-07-21

## 2023-07-21 RX ORDER — LIDOCAINE 40 MG/G
CREAM TOPICAL
Status: CANCELLED | OUTPATIENT
Start: 2023-07-21

## 2023-07-21 RX ORDER — HALOPERIDOL 5 MG/ML
1 INJECTION INTRAMUSCULAR
Status: CANCELLED | OUTPATIENT
Start: 2023-07-21

## 2023-07-21 RX ORDER — ONDANSETRON 4 MG/1
4 TABLET, ORALLY DISINTEGRATING ORAL EVERY 30 MIN PRN
Status: CANCELLED | OUTPATIENT
Start: 2023-07-21

## 2023-07-21 RX ORDER — OXYCODONE HYDROCHLORIDE 5 MG/1
5 TABLET ORAL
Status: CANCELLED | OUTPATIENT
Start: 2023-07-21

## 2023-07-21 RX ORDER — LABETALOL HYDROCHLORIDE 5 MG/ML
10 INJECTION, SOLUTION INTRAVENOUS
Status: CANCELLED | OUTPATIENT
Start: 2023-07-21

## 2023-07-21 RX ORDER — ALBUTEROL SULFATE 0.83 MG/ML
2.5 SOLUTION RESPIRATORY (INHALATION) EVERY 4 HOURS PRN
Status: CANCELLED | OUTPATIENT
Start: 2023-07-21

## 2023-07-21 RX ORDER — KETOROLAC TROMETHAMINE 30 MG/ML
15 INJECTION, SOLUTION INTRAMUSCULAR; INTRAVENOUS
Status: CANCELLED | OUTPATIENT
Start: 2023-07-21

## 2023-07-21 RX ORDER — LORAZEPAM 2 MG/ML
.5-1 INJECTION INTRAMUSCULAR
Status: CANCELLED | OUTPATIENT
Start: 2023-07-21

## 2023-07-21 RX ORDER — FENTANYL CITRATE 50 UG/ML
50 INJECTION, SOLUTION INTRAMUSCULAR; INTRAVENOUS EVERY 5 MIN PRN
Status: CANCELLED | OUTPATIENT
Start: 2023-07-21

## 2023-07-24 ENCOUNTER — TELEPHONE (OUTPATIENT)
Dept: OTOLARYNGOLOGY | Facility: CLINIC | Age: 63
End: 2023-07-24

## 2023-07-24 ENCOUNTER — DOCUMENTATION ONLY (OUTPATIENT)
Dept: LAB | Facility: CLINIC | Age: 63
End: 2023-07-24
Payer: MEDICARE

## 2023-07-24 ENCOUNTER — LAB (OUTPATIENT)
Dept: LAB | Facility: CLINIC | Age: 63
End: 2023-07-24
Payer: MEDICARE

## 2023-07-24 DIAGNOSIS — R79.89 ELEVATED SERUM CREATININE: ICD-10-CM

## 2023-07-24 DIAGNOSIS — R79.89 ELEVATED SERUM CREATININE: Primary | ICD-10-CM

## 2023-07-24 LAB
ANION GAP SERPL CALCULATED.3IONS-SCNC: 13 MMOL/L (ref 7–15)
BUN SERPL-MCNC: 52.2 MG/DL (ref 8–23)
CALCIUM SERPL-MCNC: 9.4 MG/DL (ref 8.8–10.2)
CHLORIDE SERPL-SCNC: 100 MMOL/L (ref 98–107)
CREAT SERPL-MCNC: 3.54 MG/DL (ref 0.67–1.17)
DEPRECATED HCO3 PLAS-SCNC: 22 MMOL/L (ref 22–29)
GFR SERPL CREATININE-BSD FRML MDRD: 18 ML/MIN/1.73M2
GLUCOSE SERPL-MCNC: 122 MG/DL (ref 70–99)
POTASSIUM SERPL-SCNC: 5 MMOL/L (ref 3.4–5.3)
SODIUM SERPL-SCNC: 135 MMOL/L (ref 136–145)

## 2023-07-24 PROCEDURE — 80048 BASIC METABOLIC PNL TOTAL CA: CPT

## 2023-07-24 PROCEDURE — 36415 COLL VENOUS BLD VENIPUNCTURE: CPT

## 2023-07-24 NOTE — TELEPHONE ENCOUNTER
Phone call received from pt's primary care provider stating pt is not cleared to proceed with surgery on Wednesday, due to an elevated creatinine in a post-renal transplant .  Surgery scheduling advised. Jodie Borges RN

## 2023-07-24 NOTE — PROGRESS NOTES
Patient is coming for creatinine recheck per Naima (result note 7/21), please sign pended order ASAP.  Thanks!

## 2023-07-25 NOTE — TELEPHONE ENCOUNTER
Phone call to pt.  Left message that surgery has been cancelled per primary care physician's instructions.  Asked pt to call back to discuss next steps.  Jodie Borges RN

## 2023-08-02 ENCOUNTER — TELEPHONE (OUTPATIENT)
Dept: OTOLARYNGOLOGY | Facility: CLINIC | Age: 63
End: 2023-08-02

## 2023-08-02 NOTE — TELEPHONE ENCOUNTER
Pt called regarding preop appt.  His PCP deferred him to his kidney doc due to his previous transplant.      Kidney doc: Dr. Lockett with [facility]: cell: [phone]    Pt had visit 7/24 with Dr. Lockett, biopsy on Monday.  Dr. Lockett said something is going on; its not going to get better in the next year, go ahead with surgery in the interim.    Coral Ferrell, RN

## 2023-08-04 ENCOUNTER — TELEPHONE (OUTPATIENT)
Dept: OTOLARYNGOLOGY | Facility: CLINIC | Age: 63
End: 2023-08-04
Payer: MEDICARE

## 2023-08-04 DIAGNOSIS — J34.89 NASAL OBSTRUCTION: Primary | ICD-10-CM

## 2023-08-04 NOTE — TELEPHONE ENCOUNTER
"We needed to postpone Mr. Hernandez's nasal reconstruction because his creatine went up. I spoke with Boone Lockett, his nephrologist, who shared Mr. Hernandez's clinical situation of chronic kidney rejection and prognosis that he will undergo progressive kidney failure over time requiring restarting of dialysis and/or transplantation sometime in the future. But for now, his kidneys are functional but failing. Dr. Lockett approved Mr. Hernandez going ahead with nasal reconstruction (see his note from 8/1/23 in [facility] system) and recommended modifying the dosing of medications (he will get back to me on dosing recommendations). Dr. Lockett also reported that there was no need for dialysis after surgery, he will still be able to clear medications adequately. He also approved doing the surgery on an outpatient basis. No need to admit after surgery.     I spoke with his PCP Naima Delaney who also reviewed his case and the additional information and felt he was optimized for surgery. She kindly offered to update his H&P with this additional information and her clearance statement so Mr. Hernandez did not have to go in for another preop physical.     I spoke with our  who will be asking for Anesthesia Evaluation regarding whether Mr. Hernandez can still have surgery at [facility] given this information about his chronic kidney failure and chronic rejection.    Finally, I spoke with Mr. Hernandez. He strongly wants to proceed with surgery. He reports he feels healthy but \"just has bad numbers.\" No change at his nasal site. It is healing well and he is wearing the stent comfortably. Given the views of his nephrologist and PCP, he sounds optimized for surgery. We will follow his wishes and reschedule his surgery.   Ruiz Scott MD      Addendum 8/4/23 at 1746  Dr. Lockett kindly got back to me about antibiotic dosing for Mr. Hernandez given his renal function. Here is what he wrote:    I discussed antibiotics with our transplant pharmDs. Based on a " creatinine clearance of about 23:     Levofloxacin 750 mg IV/PO q 48 hours.   Ancef (cefazolin) 2 gm q 24 hours.   Keflex (cephalexin) 500 mg q 12 hours.     Ruiz Scott MD

## 2023-08-08 ENCOUNTER — TELEPHONE (OUTPATIENT)
Dept: OTOLARYNGOLOGY | Facility: CLINIC | Age: 63
End: 2023-08-08
Payer: MEDICARE

## 2023-08-08 PROBLEM — J34.89 NASAL OBSTRUCTION: Status: ACTIVE | Noted: 2023-08-04

## 2023-08-08 NOTE — TELEPHONE ENCOUNTER
Left voicemail for patient regarding scheduling surgery with Dr. Scott.    Provided contact number to discuss.    P: [phone]    __    Nhi Guzman, on 8/8/2023 at 12:01 PM

## 2023-08-09 ENCOUNTER — TELEPHONE (OUTPATIENT)
Dept: OTOLARYNGOLOGY | Facility: CLINIC | Age: 63
End: 2023-08-09
Payer: MEDICARE

## 2023-08-09 NOTE — TELEPHONE ENCOUNTER
This writer attempted to call the patient to see if surgery with Dr. Scott can moved out 1 day to 9/14/2023, but the patient did not answer. This writer left a voicemail asking the patient if 9/14/2023 would work instead of 9/13/2023 and instructing him to call [phone] to discuss.

## 2023-08-10 ENCOUNTER — TELEPHONE (OUTPATIENT)
Dept: OTOLARYNGOLOGY | Facility: CLINIC | Age: 63
End: 2023-08-10
Payer: MEDICARE

## 2023-08-10 NOTE — TELEPHONE ENCOUNTER
Date Scheduled: 9/14/2023  Facility: Surgery Locations: [facility]  Surgeon: Dr. Ruiz Scott   Post-op appointment scheduled: 9/21/2023 at 11:00am   scheduled?: No  Surgery packet/instructions confirmed received?   To be sent out in the [place] mail  Pre op physical/PAC appointment: PCP  Special Considerations: n/a

## 2023-09-10 ENCOUNTER — HEALTH MAINTENANCE LETTER (OUTPATIENT)
Age: 63
End: 2023-09-10

## 2023-09-10 ENCOUNTER — ANESTHESIA EVENT (OUTPATIENT)
Dept: SURGERY | Facility: AMBULATORY SURGERY CENTER | Age: 63
End: 2023-09-10
Payer: MEDICARE

## 2023-09-10 RX ORDER — FENTANYL CITRATE 50 UG/ML
25 INJECTION, SOLUTION INTRAMUSCULAR; INTRAVENOUS EVERY 5 MIN PRN
Status: CANCELLED | OUTPATIENT
Start: 2023-09-10

## 2023-09-10 RX ORDER — ONDANSETRON 2 MG/ML
4 INJECTION INTRAMUSCULAR; INTRAVENOUS EVERY 30 MIN PRN
Status: CANCELLED | OUTPATIENT
Start: 2023-09-10

## 2023-09-10 RX ORDER — FENTANYL CITRATE 50 UG/ML
50 INJECTION, SOLUTION INTRAMUSCULAR; INTRAVENOUS EVERY 5 MIN PRN
Status: CANCELLED | OUTPATIENT
Start: 2023-09-10

## 2023-09-10 RX ORDER — HYDRALAZINE HYDROCHLORIDE 20 MG/ML
2.5-5 INJECTION INTRAMUSCULAR; INTRAVENOUS EVERY 10 MIN PRN
Status: CANCELLED | OUTPATIENT
Start: 2023-09-10

## 2023-09-10 RX ORDER — FENTANYL CITRATE 50 UG/ML
25 INJECTION, SOLUTION INTRAMUSCULAR; INTRAVENOUS
Status: CANCELLED | OUTPATIENT
Start: 2023-09-10

## 2023-09-10 RX ORDER — OXYCODONE HYDROCHLORIDE 5 MG/1
5 TABLET ORAL EVERY 4 HOURS PRN
Status: CANCELLED | OUTPATIENT
Start: 2023-09-10

## 2023-09-10 RX ORDER — METOPROLOL TARTRATE 1 MG/ML
1-2 INJECTION, SOLUTION INTRAVENOUS EVERY 5 MIN PRN
Status: CANCELLED | OUTPATIENT
Start: 2023-09-10

## 2023-09-10 RX ORDER — ONDANSETRON 4 MG/1
4 TABLET, ORALLY DISINTEGRATING ORAL EVERY 30 MIN PRN
Status: CANCELLED | OUTPATIENT
Start: 2023-09-10

## 2023-09-10 RX ORDER — OXYCODONE HYDROCHLORIDE 5 MG/1
10 TABLET ORAL EVERY 4 HOURS PRN
Status: CANCELLED | OUTPATIENT
Start: 2023-09-10

## 2023-09-10 RX ORDER — SODIUM CHLORIDE, SODIUM LACTATE, POTASSIUM CHLORIDE, CALCIUM CHLORIDE 600; 310; 30; 20 MG/100ML; MG/100ML; MG/100ML; MG/100ML
INJECTION, SOLUTION INTRAVENOUS CONTINUOUS
Status: CANCELLED | OUTPATIENT
Start: 2023-09-10

## 2023-09-11 ENCOUNTER — OFFICE VISIT (OUTPATIENT)
Dept: FAMILY MEDICINE | Facility: CLINIC | Age: 63
End: 2023-09-11
Payer: MEDICARE

## 2023-09-11 VITALS
DIASTOLIC BLOOD PRESSURE: 80 MMHG | OXYGEN SATURATION: 98 % | WEIGHT: 240 LBS | RESPIRATION RATE: 18 BRPM | SYSTOLIC BLOOD PRESSURE: 134 MMHG | HEART RATE: 66 BPM | BODY MASS INDEX: 35.55 KG/M2 | HEIGHT: 69 IN

## 2023-09-11 DIAGNOSIS — Z01.818 PRE-OP EXAM: Primary | ICD-10-CM

## 2023-09-11 DIAGNOSIS — M95.0 NASAL DEFECT: ICD-10-CM

## 2023-09-11 PROCEDURE — 99214 OFFICE O/P EST MOD 30 MIN: CPT | Performed by: NURSE PRACTITIONER

## 2023-09-11 RX ORDER — AMLODIPINE BESYLATE 5 MG/1
5 TABLET ORAL
COMMUNITY
Start: 2023-08-28

## 2023-09-11 ASSESSMENT — PAIN SCALES - GENERAL: PAINLEVEL: NO PAIN (0)

## 2023-09-11 NOTE — PROGRESS NOTES
[facility]  [address] [place] [id number]  Phone: [phone]  Fax: [phone]  Primary Provider:   Pre-op Performing Provider: LUZ MUHAMMAD      PREOPERATIVE EVALUATION:  Today's date: 9/11/2023    Kel Hernandez is a 63 year old male who presents for a preoperative evaluation.      9/11/2023     8:35 AM   Additional Questions   Roomed by Mary SALAZAR   Accompanied by Self       Surgical Information:  Surgery/Procedure: stage 2 left nasal reconstruction with wound preparation, local flaps, ear cartilage graft from either ear, lateral nasal wall reconstruction, possible stent placement, full thickness skin graft from either neck or ear, complex closure.   Surgery Location: [facility]  Surgeon:   Surgery Date: 09/14/2023  Time of Surgery: 06:15am  Where patient plans to recover: At home with family  Fax number for surgical facility: Note does not need to be faxed, will be available electronically in Epic.    Assessment & Plan     The proposed surgical procedure is considered INTERMEDIATE risk.    Pre-op exam      Nasal defect              - No identified additional risk factors other than previously addressed    Antiplatelet or Anticoagulation Medication Instructions:   - aspirin: Discontinue aspirin 7-10 days prior to procedure to reduce bleeding risk. It should be resumed postoperatively.     Additional Medication Instructions:   - ibuprofen (Advil, Motrin): HOLD 1 day before surgery.     RECOMMENDATION:  APPROVAL GIVEN to proceed with proposed procedure, without further diagnostic evaluation.      Subjective       HPI related to upcoming procedure: stage 2 left nasal reconstruction with wound preparation, local flaps, ear cartilage graft from either ear, lateral nasal wall reconstruction, possible stent placement, full thickness skin graft from either neck or ear, complex closure.        9/4/2023    12:19 PM   Preop Questions   1. Have you ever had a heart  attack or stroke? No   2. Have you ever had surgery on your heart or blood vessels, such as a stent placement, a coronary artery bypass, or surgery on an artery in your head, neck, heart, or legs? No   3. Do you have chest pain with activity? No   4. Do you have a history of  heart failure? No   5. Do you currently have a cold, bronchitis or symptoms of other infection? No   6. Do you have a cough, shortness of breath, or wheezing? No   7. Do you or anyone in your family have previous history of blood clots? No   8. Do you or does anyone in your family have a serious bleeding problem such as prolonged bleeding following surgeries or cuts? No   9. Have you ever had problems with anemia or been told to take iron pills? No   10. Have you had any abnormal blood loss such as black, tarry or bloody stools? No   11. Have you ever had a blood transfusion? No   12. Are you willing to have a blood transfusion if it is medically needed before, during, or after your surgery? Yes   13. Have you or any of your relatives ever had problems with anesthesia? No   14. Do you have sleep apnea, excessive snoring or daytime drowsiness? No   15. Do you have any artifical heart valves or other implanted medical devices like a pacemaker, defibrillator, or continuous glucose monitor? No   16. Do you have artificial joints? YES -    17. Are you allergic to latex? No       Health Care Directive:  Patient does not have a Health Care Directive or Living Will: Discussed advance care planning with patient; however, patient declined at this time.    Preoperative Review of :   reviewed - no record of controlled substances prescribed.      Status of Chronic Conditions:  See problem list for active medical problems.  Problems all longstanding and stable, except as noted/documented.  See ROS for pertinent symptoms related to these conditions.    DIABETES - Patient has a longstanding history of DiabetesType Type II . Patient has recent Diganosis  is going to be follow up with primary post surgery   RENAL INSUFFICIENCY - Patient has a longstanding history of moderate-severe chronic renal insufficiency. Last Cr 2.7 (patient base line)  patient is post kidney transplant nephrology is aware of his current creatinine level they have approved him to proceed with.  Please note his current creatinine level they have a treatment plan in place for him postsurgery    Review of Systems  Constitutional, neuro, ENT, endocrine, pulmonary, cardiac, gastrointestinal, genitourinary, musculoskeletal, integument and psychiatric systems are negative, except as otherwise noted.    Patient Active Problem List    Diagnosis Date Noted    Nasal obstruction 08/04/2023     Priority: Medium    Secondary hyperparathyroidism (H) 07/19/2023     Priority: Medium    Mohs defect of left nose 04/20/2023     Priority: Medium     Added automatically from request for surgery [id number]        Past Medical History:   Diagnosis Date    Cancer (H) Skin Cancer on nose    Hypertension 15years ago after transplant    Renal disease      Past Surgical History:   Procedure Laterality Date    BIOPSY  July 2023    Kidney biopsy    COLONOSCOPY  2019    ORTHOPEDIC SURGERY Bilateral     hip replacements    REPAIR MOHS Left 05/04/2023    Procedure: Stage 1 Left nasal reconstruction with wound preparation, local flaps, placement of nasal stent.;  Surgeon: Ruiz Scott MD;  Location: [facility]    TRANSPLANT Left 2011    kidney     Current Outpatient Medications   Medication Sig Dispense Refill    allopurinol (ZYLOPRIM) 100 MG tablet       amLODIPine (NORVASC) 5 MG tablet Take 5 mg by mouth      aspirin 81 MG EC tablet Take 81 mg by mouth daily      lisinopril (ZESTRIL) 20 MG tablet       LOKELMA 10 g PACK packet Take 5 g by mouth daily      metoprolol tartrate (LOPRESSOR) 25 MG tablet       mycophenolate (GENERIC EQUIVALENT) 500 MG tablet       pravastatin (PRAVACHOL) 40 MG tablet       predniSONE (DELTASONE) 5 MG  "tablet       sodium bicarbonate 325 MG tablet Take by mouth 3 times daily      tacrolimus (GENERIC EQUIVALENT) 0.5 MG capsule       Vitamin D3 (VITAMIN D, CHOLECALCIFEROL,) 25 mcg (1000 units) tablet Take by mouth daily      hydrochlorothiazide (HYDRODIURIL) 12.5 MG tablet  (Patient not taking: Reported on 9/11/2023)         Allergies   Allergen Reactions    Clonidine Fatigue    Nsaids      PT HAS HAD A KIDNEY TRANSPLANT        Social History     Tobacco Use    Smoking status: Never    Smokeless tobacco: Never   Substance Use Topics    Alcohol use: Not Currently     Comment: mild. 1/2 weeks     Family History   Problem Relation Age of Onset    Other Cancer Mother         Lung Cancer     History   Drug Use Unknown         Objective     /80 (BP Location: Right arm, Patient Position: Sitting, Cuff Size: Adult Regular)   Pulse 66   Resp 18   Ht 1.746 m (5' 8.75\")   Wt 108.9 kg (240 lb)   SpO2 98%   BMI 35.70 kg/m      Physical Exam    GENERAL APPEARANCE: healthy, alert and no distress     EYES: EOMI, PERRL     HENT: ear canals and TM's normal and nose and mouth without ulcers or lesions     NECK: no adenopathy, no asymmetry, masses, or scars and thyroid normal to palpation     RESP: lungs clear to auscultation - no rales, rhonchi or wheezes     CV: regular rates and rhythm, normal S1 S2, no S3 or S4 and no murmur, click or rub     ABDOMEN:  soft, nontender, no HSM or masses and bowel sounds normal     MS: extremities normal- no gross deformities noted, no evidence of inflammation in joints, FROM in all extremities.     SKIN: no suspicious lesions or rashes     NEURO: Normal strength and tone, sensory exam grossly normal, mentation intact and speech normal     PSYCH: mentation appears normal. and affect normal/bright     LYMPHATICS: No cervical adenopathy    Recent Labs   Lab Test 07/24/23  1001 07/17/23  0927   HGB  --  10.0*   PLT  --  161   * 137   POTASSIUM 5.0 4.5   CR 3.54* 3.24*   A1C  --  6.4* "        Recent labs in care everywhere 8/28/2023  Cr 2.7  Hemoglobin 9.3     Diagnostics:     EKG: sinus bradycardia, normal axis, normal intervals, no acute ST/T changes c/w ischemia, no LVH by voltage criteria, unchanged from previous tracings    Revised Cardiac Risk Index (RCRI):  The patient has the following serious cardiovascular risks for perioperative complications:   - No serious cardiac risks = 0 points     RCRI Interpretation: 0 points: Class I (very low risk - 0.4% complication rate)         Signed Electronically by: SANCHEZ Higgins CNP  Copy of this evaluation report is provided to requesting physician.

## 2023-09-11 NOTE — ANESTHESIA PREPROCEDURE EVALUATION
Anesthesia Pre-Procedure Evaluation    Patient: Kel Hernandez   MRN: [id number] : 1960        Procedure : Procedure(s):  stage 2 left nasal reconstruction with wound preparation, local flaps, ear cartilage graft from either ear, lateral nasal wall reconstruction, possible stent placement, full thickness skin graft from either neck or ear, complex closure.          Past Medical History:   Diagnosis Date    Cancer (H) Skin Cancer on nose    Hypertension 15years ago after transplant    Renal disease       Past Surgical History:   Procedure Laterality Date    COLONOSCOPY      ORTHOPEDIC SURGERY Bilateral     hip replacements    REPAIR MOHS Left 2023    Procedure: Stage 1 Left nasal reconstruction with wound preparation, local flaps, placement of nasal stent.;  Surgeon: Ruiz Scott MD;  Location: [facility]    TRANSPLANT Left     kidney      Allergies   Allergen Reactions    Nsaids      PT HAS HAD A KIDNEY TRANSPLANT      Social History     Tobacco Use    Smoking status: Never    Smokeless tobacco: Never   Substance Use Topics    Alcohol use: Not Currently     Comment: mild. 1/2 weeks      Wt Readings from Last 1 Encounters:   23 111.6 kg (246 lb)        Anesthesia Evaluation   Pt has had prior anesthetic.     No history of anesthetic complications       ROS/MED HX  ENT/Pulmonary:  - neg pulmonary ROS     Neurologic:  - neg neurologic ROS     Cardiovascular:     (+)  hypertension- -   -  - -                                   (-) murmur   METS/Exercise Tolerance: >4 METS    Hematologic:  - neg hematologic  ROS     Musculoskeletal:  - neg musculoskeletal ROS     GI/Hepatic:  - neg GI/hepatic ROS     Renal/Genitourinary:     (+) renal disease, type: CRI and ESRD, Pt does not require dialysis,           Endo:  - neg endo ROS     Psychiatric/Substance Use:  - neg psychiatric ROS     Infectious Disease:  - neg infectious disease ROS     Malignancy:  - neg malignancy ROS     Other:  - neg other ROS           Physical Exam    Airway        Mallampati: II   TM distance: > 3 FB   Neck ROM: full   Mouth opening: > 3 cm    Respiratory Devices and Support         Dental     Comment: Teeth examined without any significant abnormality, patient denies loose, missing or chipped teeth or anything removable.         Cardiovascular          Rhythm and rate: regular and normal (-) no murmur    Pulmonary   pulmonary exam normal        breath sounds clear to auscultation           OUTSIDE LABS:  CBC:   Lab Results   Component Value Date    WBC 7.0 07/17/2023    WBC 8.2 08/06/2020    HGB 10.0 (L) 07/17/2023    HGB 12.2 (L) 08/06/2020    HCT 32.5 (L) 07/17/2023    HCT 38.6 (L) 08/06/2020     07/17/2023     08/06/2020     BMP:   Lab Results   Component Value Date     (L) 07/24/2023     07/17/2023    POTASSIUM 5.0 07/24/2023    POTASSIUM 4.5 07/17/2023    CHLORIDE 100 07/24/2023    CHLORIDE 102 07/17/2023    CO2 22 07/24/2023    CO2 25 07/17/2023    BUN 52.2 (H) 07/24/2023    BUN 48.4 (H) 07/17/2023    CR 3.54 (H) 07/24/2023    CR 3.24 (H) 07/17/2023     (H) 07/24/2023     (H) 07/17/2023     COAGS:   Lab Results   Component Value Date    INR 0.93 02/10/2005     POC: No results found for: BGM, HCG, HCGS  HEPATIC:   Lab Results   Component Value Date    ALBUMIN 3.6 11/18/2003     OTHER:   Lab Results   Component Value Date    A1C 6.4 (H) 07/17/2023    LIZETH 9.4 07/24/2023    PHOS 3.1 11/18/2003       Anesthesia Plan    ASA Status:  3    NPO Status:  NPO Appropriate    Anesthesia Type: General.     - Airway: ETT   Induction: Intravenous, Propofol.   Maintenance: Balanced.        Consents    Anesthesia Plan(s) and associated risks, benefits, and realistic alternatives discussed. Questions answered and patient/representative(s) expressed understanding.     - Discussed:     - Discussed with:  Patient      - Extended Intubation/Ventilatory Support Discussed: No.      - Patient is DNR/DNI Status: No      Use of blood products discussed: No .     Postoperative Care    Pain management: IV analgesics, Oral pain medications, Multi-modal analgesia.   PONV prophylaxis: Ondansetron (or other 5HT-3), Dexamethasone or Solumedrol, Background Propofol Infusion     Comments:    Other Comments: Discussed plan for general anesthetic with Oral ETT. Discussed risks of sore throat, post op pain/nausea, oropharyngeal damage, rare major complications.  Previous case without issue. Has progressive kidney failure but BMP stable.        H&P reviewed: Unable to attach H&P to encounter due to EHR limitations. H&P Update: appropriate H&P reviewed, patient examined. No interval changes since H&P (within 30 days).         Dewayne Ontiveros MD

## 2023-09-14 ENCOUNTER — HOSPITAL ENCOUNTER (OUTPATIENT)
Facility: AMBULATORY SURGERY CENTER | Age: 63
Discharge: HOME | End: 2023-09-14
Attending: OTOLARYNGOLOGY | Admitting: OTOLARYNGOLOGY
Payer: MEDICARE

## 2023-09-14 ENCOUNTER — ANESTHESIA (OUTPATIENT)
Dept: SURGERY | Facility: AMBULATORY SURGERY CENTER | Age: 63
End: 2023-09-14
Payer: MEDICARE

## 2023-09-14 VITALS
DIASTOLIC BLOOD PRESSURE: 73 MMHG | HEART RATE: 77 BPM | OXYGEN SATURATION: 96 % | RESPIRATION RATE: 15 BRPM | TEMPERATURE: 98.1 F | SYSTOLIC BLOOD PRESSURE: 133 MMHG | WEIGHT: 240 LBS | HEIGHT: 69 IN | BODY MASS INDEX: 35.55 KG/M2

## 2023-09-14 DIAGNOSIS — M95.0 MOHS DEFECT OF LEFT NOSE: Primary | ICD-10-CM

## 2023-09-14 DIAGNOSIS — Z98.890 MOHS DEFECT OF LEFT NOSE: Primary | ICD-10-CM

## 2023-09-14 PROCEDURE — 21235 EAR CARTILAGE GRAFT: CPT

## 2023-09-14 PROCEDURE — 14061 TIS TRNFR E/N/E/L10.1-30SQCM: CPT

## 2023-09-14 PROCEDURE — G8918 PT W/O PREOP ORDER IV AB PRO: HCPCS

## 2023-09-14 PROCEDURE — 15004 WOUND PREP F/N/HF/G: CPT

## 2023-09-14 PROCEDURE — G8907 PT DOC NO EVENTS ON DISCHARG: HCPCS

## 2023-09-14 PROCEDURE — 30465 REPAIR NASAL STENOSIS: CPT | Mod: 74

## 2023-09-14 RX ORDER — FENTANYL CITRATE 50 UG/ML
INJECTION, SOLUTION INTRAMUSCULAR; INTRAVENOUS PRN
Status: DISCONTINUED | OUTPATIENT
Start: 2023-09-14 | End: 2023-09-14

## 2023-09-14 RX ORDER — CEPHALEXIN 500 MG/1
500 CAPSULE ORAL EVERY 12 HOURS
Qty: 14 CAPSULE | Refills: 0 | Status: SHIPPED | OUTPATIENT
Start: 2023-09-14 | End: 2023-09-21

## 2023-09-14 RX ORDER — PROPOFOL 10 MG/ML
INJECTION, EMULSION INTRAVENOUS PRN
Status: DISCONTINUED | OUTPATIENT
Start: 2023-09-14 | End: 2023-09-14

## 2023-09-14 RX ORDER — MUPIROCIN 20 MG/G
OINTMENT TOPICAL PRN
Status: DISCONTINUED | OUTPATIENT
Start: 2023-09-14 | End: 2023-09-14 | Stop reason: HOSPADM

## 2023-09-14 RX ORDER — DEXAMETHASONE SODIUM PHOSPHATE 4 MG/ML
INJECTION, SOLUTION INTRA-ARTICULAR; INTRALESIONAL; INTRAMUSCULAR; INTRAVENOUS; SOFT TISSUE PRN
Status: DISCONTINUED | OUTPATIENT
Start: 2023-09-14 | End: 2023-09-14

## 2023-09-14 RX ORDER — GLYCOPYRROLATE 0.2 MG/ML
INJECTION, SOLUTION INTRAMUSCULAR; INTRAVENOUS PRN
Status: DISCONTINUED | OUTPATIENT
Start: 2023-09-14 | End: 2023-09-14

## 2023-09-14 RX ORDER — VASOPRESSIN IN 0.9 % NACL 2 UNIT/2ML
SYRINGE (ML) INTRAVENOUS PRN
Status: DISCONTINUED | OUTPATIENT
Start: 2023-09-14 | End: 2023-09-14

## 2023-09-14 RX ORDER — MUPIROCIN 20 MG/G
OINTMENT TOPICAL
Qty: 22 G | Refills: 1 | Status: SHIPPED | OUTPATIENT
Start: 2023-09-14

## 2023-09-14 RX ORDER — OXYCODONE HYDROCHLORIDE 5 MG/1
2.5 TABLET ORAL EVERY 8 HOURS PRN
Qty: 8 TABLET | Refills: 0 | Status: SHIPPED | OUTPATIENT
Start: 2023-09-14 | End: 2023-09-19

## 2023-09-14 RX ORDER — ONDANSETRON 2 MG/ML
INJECTION INTRAMUSCULAR; INTRAVENOUS PRN
Status: DISCONTINUED | OUTPATIENT
Start: 2023-09-14 | End: 2023-09-14

## 2023-09-14 RX ORDER — ACETAMINOPHEN 325 MG/1
975 TABLET ORAL ONCE
Status: COMPLETED | OUTPATIENT
Start: 2023-09-14 | End: 2023-09-14

## 2023-09-14 RX ORDER — EPHEDRINE SULFATE 50 MG/ML
INJECTION, SOLUTION INTRAMUSCULAR; INTRAVENOUS; SUBCUTANEOUS PRN
Status: DISCONTINUED | OUTPATIENT
Start: 2023-09-14 | End: 2023-09-14

## 2023-09-14 RX ORDER — LIDOCAINE 40 MG/G
CREAM TOPICAL
Status: DISCONTINUED | OUTPATIENT
Start: 2023-09-14 | End: 2023-09-15 | Stop reason: HOSPADM

## 2023-09-14 RX ORDER — LIDOCAINE HYDROCHLORIDE 20 MG/ML
INJECTION, SOLUTION INFILTRATION; PERINEURAL PRN
Status: DISCONTINUED | OUTPATIENT
Start: 2023-09-14 | End: 2023-09-14

## 2023-09-14 RX ORDER — CEFAZOLIN SODIUM 2 G/50ML
2 SOLUTION INTRAVENOUS
Status: DISCONTINUED | OUTPATIENT
Start: 2023-09-14 | End: 2023-09-15 | Stop reason: HOSPADM

## 2023-09-14 RX ORDER — LIDOCAINE HYDROCHLORIDE AND EPINEPHRINE 10; 10 MG/ML; UG/ML
INJECTION, SOLUTION INFILTRATION; PERINEURAL PRN
Status: DISCONTINUED | OUTPATIENT
Start: 2023-09-14 | End: 2023-09-14 | Stop reason: HOSPADM

## 2023-09-14 RX ORDER — SODIUM CHLORIDE, SODIUM LACTATE, POTASSIUM CHLORIDE, CALCIUM CHLORIDE 600; 310; 30; 20 MG/100ML; MG/100ML; MG/100ML; MG/100ML
INJECTION, SOLUTION INTRAVENOUS CONTINUOUS
Status: DISCONTINUED | OUTPATIENT
Start: 2023-09-14 | End: 2023-09-15 | Stop reason: HOSPADM

## 2023-09-14 RX ADMIN — Medication 2 UNITS: at 08:17

## 2023-09-14 RX ADMIN — Medication 40 MG: at 07:56

## 2023-09-14 RX ADMIN — Medication 40 MG: at 07:24

## 2023-09-14 RX ADMIN — Medication 2 UNITS: at 11:08

## 2023-09-14 RX ADMIN — Medication 100 MCG: at 07:34

## 2023-09-14 RX ADMIN — PROPOFOL 200 MG: 10 INJECTION, EMULSION INTRAVENOUS at 07:24

## 2023-09-14 RX ADMIN — Medication 0.2 MCG/KG/MIN: at 07:48

## 2023-09-14 RX ADMIN — EPHEDRINE SULFATE 5 MG: 50 INJECTION, SOLUTION INTRAMUSCULAR; INTRAVENOUS; SUBCUTANEOUS at 10:31

## 2023-09-14 RX ADMIN — LIDOCAINE HYDROCHLORIDE 100 MG: 20 INJECTION, SOLUTION INFILTRATION; PERINEURAL at 07:24

## 2023-09-14 RX ADMIN — PROPOFOL 50 MCG/KG/MIN: 10 INJECTION, EMULSION INTRAVENOUS at 09:17

## 2023-09-14 RX ADMIN — FENTANYL CITRATE 50 MCG: 50 INJECTION, SOLUTION INTRAMUSCULAR; INTRAVENOUS at 10:15

## 2023-09-14 RX ADMIN — LIDOCAINE HYDROCHLORIDE 20 MG: 20 INJECTION, SOLUTION INFILTRATION; PERINEURAL at 09:38

## 2023-09-14 RX ADMIN — Medication 100 MCG: at 09:28

## 2023-09-14 RX ADMIN — EPHEDRINE SULFATE 10 MG: 50 INJECTION, SOLUTION INTRAMUSCULAR; INTRAVENOUS; SUBCUTANEOUS at 08:51

## 2023-09-14 RX ADMIN — FENTANYL CITRATE 50 MCG: 50 INJECTION, SOLUTION INTRAMUSCULAR; INTRAVENOUS at 09:41

## 2023-09-14 RX ADMIN — Medication 0.2 MG: at 11:56

## 2023-09-14 RX ADMIN — Medication 2 UNITS: at 09:10

## 2023-09-14 RX ADMIN — CEFAZOLIN SODIUM 2 G: 2 SOLUTION INTRAVENOUS at 07:16

## 2023-09-14 RX ADMIN — Medication 1 UNITS: at 08:44

## 2023-09-14 RX ADMIN — Medication 2 UNITS: at 11:48

## 2023-09-14 RX ADMIN — Medication 2 UNITS: at 08:57

## 2023-09-14 RX ADMIN — Medication 100 MCG: at 07:38

## 2023-09-14 RX ADMIN — EPHEDRINE SULFATE 5 MG: 50 INJECTION, SOLUTION INTRAMUSCULAR; INTRAVENOUS; SUBCUTANEOUS at 09:44

## 2023-09-14 RX ADMIN — LIDOCAINE HYDROCHLORIDE 20 MG: 20 INJECTION, SOLUTION INFILTRATION; PERINEURAL at 11:47

## 2023-09-14 RX ADMIN — Medication 1 UNITS: at 08:48

## 2023-09-14 RX ADMIN — ONDANSETRON 4 MG: 2 INJECTION INTRAMUSCULAR; INTRAVENOUS at 11:47

## 2023-09-14 RX ADMIN — DEXAMETHASONE SODIUM PHOSPHATE 4 MG: 4 INJECTION, SOLUTION INTRA-ARTICULAR; INTRALESIONAL; INTRAMUSCULAR; INTRAVENOUS; SOFT TISSUE at 08:03

## 2023-09-14 RX ADMIN — Medication 1 UNITS: at 07:50

## 2023-09-14 RX ADMIN — Medication 1 UNITS: at 08:34

## 2023-09-14 RX ADMIN — ACETAMINOPHEN 975 MG: 325 TABLET ORAL at 06:29

## 2023-09-14 RX ADMIN — Medication 1 UNITS: at 08:11

## 2023-09-14 RX ADMIN — Medication 2 UNITS: at 09:05

## 2023-09-14 RX ADMIN — Medication 100 MCG: at 09:31

## 2023-09-14 RX ADMIN — Medication 2 UNITS: at 08:21

## 2023-09-14 RX ADMIN — GLYCOPYRROLATE 0.1 MG: 0.2 INJECTION, SOLUTION INTRAMUSCULAR; INTRAVENOUS at 07:46

## 2023-09-14 RX ADMIN — CEFAZOLIN SODIUM 2 G: 2 SOLUTION INTRAVENOUS at 11:16

## 2023-09-14 RX ADMIN — Medication 200 MCG: at 07:46

## 2023-09-14 RX ADMIN — SODIUM CHLORIDE, SODIUM LACTATE, POTASSIUM CHLORIDE, CALCIUM CHLORIDE: 600; 310; 30; 20 INJECTION, SOLUTION INTRAVENOUS at 07:16

## 2023-09-14 RX ADMIN — Medication 1 UNITS: at 08:25

## 2023-09-14 RX ADMIN — Medication 0.3 MG: at 10:31

## 2023-09-14 RX ADMIN — EPHEDRINE SULFATE 5 MG: 50 INJECTION, SOLUTION INTRAMUSCULAR; INTRAVENOUS; SUBCUTANEOUS at 11:45

## 2023-09-14 RX ADMIN — LIDOCAINE HYDROCHLORIDE 20 MG: 20 INJECTION, SOLUTION INFILTRATION; PERINEURAL at 10:35

## 2023-09-14 RX ADMIN — Medication 100 MCG: at 07:30

## 2023-09-14 RX ADMIN — Medication 2 UNITS: at 08:14

## 2023-09-14 NOTE — PROGRESS NOTES
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 Garcia.   -5/4/23: stage 1- superiorly based melolabial flap, intranasal skin graft (donor = flap standing cutaneous deformity), placement of left nasal stent (path = Negative for malignancy).  -9/14/23: stage 2- skin and scar excision, lateral nasal wall reconstruction, placement of a conchal cartilage alar batten graft from left ear donor, nasal alar advancement flap (path = pending).  Medical Decision Making (MDM) (acute complicated injury because of the full-thickness component of his wound): Reconstructed today without complication.   Care Checklist:  _Anticipate the need for additional stages of surgery to complete the reconstructive process to include: Stage 3- supra alar crease reconstruction because the thickness of the flap is expected to obliterate this facial landmark. And any additional procedures to address flap thickness or scar widening.   _Path from 09/14/23  _RTC 1 week for suture removal  _Pain management: Tylenol. Oxycodone for breakthrough pain. NO TORADOL.   _Was oozy during his stage 2 surgery. Consider booking additional time if we perform stage 3 surgery.      2.) Left nasal obstruction from the following anatomic causes: loss of nasal support framework after cancer resection  Medical Decision Making (acute complicated injury): The skin cancer involved deep structures of the nose and resection of those structures destabilized the nasal support framework resulting in left dynamic nasal valve collapse and resulting nasal obstruction. The lateral nasal wall was reconstructed as described above. Given the size of the intranasal wound, he is at significant risk for scar and skin graft contracture. Nasal stenting is medically necessary to counteract the contractile forces of his scar and skin graft that reconstructed his  mucosal lining.   Care Checklist:  _Continue nasal stent use on the left side. Stent exchange on 5/24/23, 6/22/23.    _Nasal stent care: wash stent breathing port with saline followed by placing mupirocin around the outside of the stent where the stent touches the nostril skin.      3.) Comorbidities that increase the risk of surgery: Kidney transplant 15 years ago with immunosuppression, bilateral hip replacement, no NSAIDS because of kidney transplant. Aspirin.   MDM: (stable chronic problem or illness).   We discussed the patients comorbidities listed above increase the risks associated with any procedure and recommendations to mitigate those risks are listed in the care checklist below.   Care Checklist:  _No NSAIDs for postoperative pain because of kidney transplant.   _Anticoagulation plan with nephrologist whether he can stop ASA prior to surgery and how long he can remain off postop. Prior anticoagulation plan included being off ASA 7 days preop and 7 days postop. Update 9/14/23: last dose 9/7/23. Plan for being off asa 7 days postop.   _Should not take Lisinopril on the day of any future surgeries because it makes his blood pressure harder to manage under anesthesia.   _Renal dosing for postoperative medications: Oxycodone 2.5mg q8hrs, Levofloxacin 750 mg IV/PO q 48 hours, Ancef (cefazolin) 2 gm q 24 hours, Keflex (cephalexin) 500 mg q 12 hours.   _Discussed with pharmacist when to start keflex given his intraoperative dosing and they recommended this evening.   _Needs anesthesia review before operating again in [facility] because his kidney function could change.      Background/Connection:   Preferred name = Jose D  What is most important to know about you as a person? (No medical information) Retired. . Planning on [place] in [place] and [place].      Photographs:  consents signed 4/20/23.     Ruiz Scott MD

## 2023-09-14 NOTE — DISCHARGE INSTRUCTIONS
[facility]   Adult Discharge Orders & Instructions     For 24 hours after surgery    Get plenty of rest.  A responsible adult must stay with you for at least 24 hours after you leave the hospital.   Do not drive or use heavy equipment.  If you have weakness or tingling, don't drive or use heavy equipment until this feeling goes away.  Do not drink alcohol.  Avoid strenuous or risky activities.  Ask for help when climbing stairs.   You may feel lightheaded.  IF so, sit for a few minutes before standing.  Have someone help you get up.   If you have nausea (feel sick to your stomach): Drink only clear liquids such as apple juice, ginger ale, broth or 7-Up.  Rest may also help.  Be sure to drink enough fluids.  Move to a regular diet as you feel able.  You may have a slight fever. Call the doctor if your fever is over 100 F (37.7 C) (taken under the tongue) or lasts longer than 24 hours.  You may have a dry mouth, a sore throat, muscle aches or trouble sleeping.  These should go away after 24 hours.  Do not make important or legal decisions.   Call your doctor for any of the followin.  Signs of infection (fever, growing tenderness at the surgery site, a large amount of drainage or bleeding, severe pain, foul-smelling drainage, redness, swelling).    2. It has been over 8 to 10 hours since surgery and you are still not able to urinate (pass water).    3.  Headache for over 24 hours.        Patient Instructions after Facial Surgery    Ruiz Scott MD    Please read and familiarize yourself with these instructions. By following them carefully, you will assist in obtaining the best possible result from your surgery. If questions arise, do not hesitate to contact with me and discuss your questions at any time.     Surgery Site Care:    [] Pressure Dressing: A pressure dressing was applied to your _____________________________.     [] Remove this pressure dressing ____ hours after surgery.     []  Keep this pressure dressing in place until your next visit to our office.    [x] Nasal Stent and Splint: A firm nasal stent was placed on the surgery side. A soft, flexible silicone splint was placed on the opposite side of your septum to help hold the stent. Both the firm stent and soft splint are held in place by stitches.     [x]  Nasal Hydration: The day after surgery, begin copious nasal hydration by spraying 4-6 sprays of nasal saline into the nasal stent breathing port and the opposite nostril followed by placing mupirocin ointment around the edge of the nasal stent where the stent touches your reconstructed skin. Perform this regimen every 2 hours while you are awake.    [x] Incision care (for incisions away from the eyes): Apply the antibiotic ointment Bactroban (mupirocin) to all of your incisions every 2 hours while you are awake. The incisions should be covered at all times by ointment because new skin cells grow best across moist surfaces. Use the Bactroban ointment for 3 days. On the fourth day, stop using the Bactroban and switch to Vaseline ointment. Continue to apply the Vaseline ointment every 2 hours to your incisions while you are awake. The switch to Vasoline is made because occasionally patients can develop an allergy to Bactoban if used for a long time.     [] Incision care (for incisions around the eyes):  For incisions near the eyes, apply the antibiotic ointment Erythromycin ophthalmic to all of your incisions every 2 hours while you are awake. Erythromycin ophthalmic ointment is safe to have near or in your eyes. The incisions should be covered at all times by the ointment because skin cells grow best across moist surfaces.     [] Steri-strips:  Steri-strips or wound tape was applied to your incisions. Keep this tape dry  until you see Dr. Scott in the office. If the tape falls off, call Dr. Scott's or his office for further instructions about wound care.     [] Crusting: Avoid crust  build-up along your incisions. If crusts form, apply ointment more frequently. Crust build-up is a sign that your incisions or wounds are too dry.    [] Bolster: A bolster has been applied to your reconstructive site to immobilize the area and promote healing. The bolster is usually removed at the first or second postoperative visit. It is very important to avoid bumping the bolster. Any trauma to the bolster may damage the skin graft. Apply the antibiotic ointment Bactroban (mupirocin) to the bolster every 2 hours while you are awake. Use the Bactroban ointment for 3 days. On the fourth day, stop using the Bactroban and switch to Vaseline ointment. Continue to apply the Vaseline ointment every 2 hours to your incisions while you are awake. The switch to Vaseline is made because occasionally patients can develop an allergy to Bactroban if used for a long time. If the bolster is near your eye, do not use Bactroban or Vasoline near the eye; rather use Erythromycin ointment on the part of the bolster near your eye. The bolster should be covered at all times by the ointment. Do not get the bolster wet in the shower or bath.      [] Granulation: Some areas of your face were left open to allow healing from the inside-out, a process known as granulation. To promote healing, the open area should be covered at all times by ointment. If the open area drys out, it will not heal properly. Apply the Bactroban ointment every 2 hours while you are awake to this area for 3 days. On the fourth day, stop using the Bactroban and switch to Vaseline ointment. Continue to apply the Vasoline oointment every 2 hours to the open areas while you are awake     [] Full Thickness Skin Graft Donor Site: A skin graft was removed from your ear, neck, face, leg, or groin. It has been partially or fully closed with sutures.     [] Steri-strips (wound tape) have been applied to the area. Keep this tape in place and dry until you see Dr. Scott.     []  Please keep this area moist at all times. Apply the antibiotic ointment Bactroban to this donor area every 2 hours while you are awake. Use the Bactroban ointment for 3 days. On the fourth day, stop using the Bactroban and switch to Vaseline ointment. Continue to apply the Vasoline ointment every 2 hours to the donor area while you are awake until you see Dr. Scott.    [] Split Thickness Skin Graft Donor Site: A skin graft was removed from your leg, arm, or waist. It is covered with a clear dressing. Bloody fluid will collect under the dressing. This fluid helps promote healing. Try to keep the dressing and collected fluid in place for as long as possible. Do not disturb the dressing or get the dressing wet. If the fluid drains out, do not remove the dressing. Simply tape gauze over the leaking area to catch the fluid and keep your clothes clean. If there is concern about the dressing or healing of the skin graft site, please call our office.     [] Ear Cartilage [place]: Ear cartilage was removed from one or both of your ears and used to strengthen and shape your reconstruction. A gauze bolster has been sewn onto your ear to immobilize the area and promote healing. The bolster is usually removed at the first or second postoperative visit. It is very important to avoid bumping the bolster. Apply the antibiotic ointment Bactroban to the bolster every 2 hours while you are awake. Use the Bactroban ointment for 3 days. On the fourth day, stop using the Bactroban and switch to Vaseline ointment. Continue to apply the Vaseline ointment every 2 hours to your incisions while you are awake. The bolster should be covered at all times by the ointment. Do not get the bolster wet in the shower or bath.     [] Rib Cartilage [place]: Rib cartilage was removed from your chest and used to strengthen and your reconstruction. Steri-strips are across the incision. You may get these strips wet in the shower 72 hours after surgery. They  will eventually fall off on their own.     [] Integra Care: A special wound dressing known as Integra was applied to your wound. It was then covered by gauze and spandex that is stapled to your skin. Pour sterile saline onto the spandex dressing every 4 hours while you are awake. Only get it wet with sterile saline. Do NOT let the dressing get wet in the shower or bath with regular water. Do not go out in a dawna or dirty environment. Keep the dressing clean at all times.     [] Drain: A drain was placed in your surgical site.  If this drain was removed in the hospital, leaking of fluid out of the wound where the drain exited is expected. You may get this area wet in the shower 72 hours after removal of the drain. Please call our office if the drainage is foul smelling or the site becomes more red or painful. If the drain has not been removed, please call ____to arrange removal of the drain on ________     [] Ice Packs:     [] Apply ice packs to your eyes and around the surgical site during the first 24 hours. The packs can rest gently on the surgery site but avoid traumatizing the surgical areas. Either a commercially available ice pack from your pharmacy or crushed ice in a plastic bag covered with a cloth may be used. Do not let the skin get too cool by keeping a cloth between the ice pack and your skin.     [] No Ice Packs: the cold could damage your skin.    Bathing Instructions:    Showering:    [] Do NOT get your surgery site(s) wet until approved by Dr. Scott. You may take a tub bath or shower from the neck down after surgery but you must keep your surgery site(s) dry.     [x] You may shower and wash your hair 72 hours after surgery. Use hypoallergenic shampoo (baby shampoo). Let warm, soapy shower water run over your face and incisions. Do not scrub or bump your surgery site(s). Gently pat your surgery site(s) dry with a towel. Apply ointment over your surgery sites after drying.    []  For patients with a  bolster: You may wash your face, but carefully avoid the bolster. You may take a tub bath starting 48 hours after surgery but you must keep your bolster dry. Do NOT get the bolster wet.     Bath: You may take a bath 48 hours after surgery, but do not get any incisions wet in the bath for 6 weeks.     Facial Care Instructions:    Brushing:    [x] You may brush your teeth normally.  [] Brush your teeth gently with a soft small toothbrush.    Shave:    [] You may shave your entire face as normal.  [x] Refrain from shaving around the surgical site until approved by Dr. Scott.  Makeup:    [] Resume normal makeup use.  [] Do not wear makeup until approved by Dr. Scott.    Lipstick:  [] Resume normal lipstick use.  [] Do not use lipstick for one week. Gently coat lips with Vaseline if needed to treat dryness.    Facial movement:  [] Resume your normal facial movements and speaking.  [x] Avoid smiling, grinning, or excessive facial movement for one week.  [] Avoid long conversations or speaking on the phone for one week.  [] Avoid turning your head from side-to-side or up-and-down.    Hair dryer:  [] You may use a hair dryer as usual.  [x] Use a hair dryer on COOL setting only since you may not have full sensation in the operative areas.    Hair care:  [] You may comb and color your hair as usual.  [x] Be careful when combing your hair to avoid catching your comb in the suture line.  [x] Hair coloring and permanents should be postponed until 4 weeks after surgery.    Activities:    Eating: Avoid foods that require prolonged chewing. Otherwise, your diet has no restrictions.    No smoking. Tobacco or any product with nicotine (patch or gum) can severely impair the healing process and result in a poor surgical result.    Alcohol. No alcohol consumption until all bandages and sutures have been removed and you have fully recovered from surgery.    Rest. Obtain more rest than usual.    Strenuous activities. Avoid any straining,  bending, lifting over 15 lbs. and other activities that will raise your blood pressure or pulse because this may cause unnecessary bleeding. Do not resume your activities until approved by Dr. Scott. You can usually resume light activities 1 week after your surgery, but you must continue to avoid any activity that will raise your blood pressure or pulse because this may cause unnecessary bleeding. Four weeks after surgery you can usually begin to slowly resume your usual activities with the goal of returning to all activities 6 weeks after surgery.    Head elevated. Keep your head elevated to reduce swelling and drainage. When resting, lie on 3 pillows or sleep in a recliner.     Driving. Do not drive until you have stopped all pain medications, are pain free, and can turn your head fully in all directions.    Avoid trauma. Be careful not to bump your surgery site(s).    Sun protection. Avoid sun or sunlamps for 6 weeks after surgery. Heat may cause your surgery site to swell. After that, please wear a hat and use sunscreen when exposed to sunlight (SPF of 30 or greater is recommended).    Swimming. Do not go swimming for 6 weeks.    Travel. Avoid travel for 6 weeks after surgery.      Medication Instructions:     [x] Tylenol: Please take over-the-counter Tylenol (acetaminophen) for pain as instructed on the packaging. Never take more Tylenol than the packaging says is safe.      [] Ibuprofen: You can also take over-the-counter Ibuprofen (Motrin, Advil) for pain as instructed on the packaging. Never take more Ibuprofen than the packaging says is safe.     [x] Moderate to Severe Pain: A prescription pain medication has been prescribed. Only take this prescription pain medication if you are experiencing moderate to serve pain despite taking over the counter pain medications like Tylenol or Ibuprofen. Take this prescription medication as needed according to your pharmacist's instructions.     [] Avoid Excessive  Tylenol: The prescription pain medication that was prescribed after surgery or that you already take has Tylenol in it. Taking this prescription pain medication and over-the-counter Tylenol may result in Tylenol overdose and damage to your liver. Avoid too much Tylenol. If questions arise about pain management call Dr. Scott or talk with your pharmacist.     [] Avoid Aspirin: Do not take Aspirin or pain medications that contain Aspirin for 7 days after surgery. These medications will thin your blood and may cause a hematoma or excessive bleeding.     [] Avoid NSAIDs: Do not take Non-Steroidal Anti-Inflammatory Drugs (NSAIDs) such as Advil, Excedrine, Ibuprofen, Alieve, Naproxen, etc  These medications will thin your blood and may cause a hematoma or excessive bleeding     [x] Antibiotics: Antibiotics have been prescribed to prevent infection. Take the antibiotics according to your pharmacist's instructions. Can start the antibiotics tonight.     [x] Blood Thinners: You usually take a blood thinner. Your primary care provider or specialist who prescribes your blood thinners has created an anticoagulation plan for you around the time of your surgery. Please follow those instructions.       Your blood thinning medication plan includes: hold your aspirin for 7 days after surgery.     If your primary care provider feels that you need to restart your blood thinner before the planned restarting date, please contact Dr. Scott or his office staff immediately. If you do not understand what to do with your blood thinners, call our office, or Dr. Scott immediately!    [x] Resume your usual medications unless otherwise instructed.    [] Other: _________________________________________________________      What to Expect During Healing:    Bleeding: There may be a fair amount of bloody fluid oozing from your surgery site. You will be applying ointment frequently to the surgery sites. As the ointment heats to your body  temperature, it becomes runny and mixes with bloody secretions to form a pink colored fluid. This is very common and slows down during the first 3-5 days after surgery. Also during the first 72 hours, drips of blood commonly form at your surgery site. Continue to apply ointment to prevent those drips from hardening into crusts. If you have bright red blood constantly dripping or pouring down your face, please call Dr. Scott, Dr. Scott's office, 911, or go to your nearest Emergency Department.    Bruising and Swelling: Bruising or swelling around the surgery site is common and it can extend well into the face and neck. If you have rapid swelling or bruising, or areas that turn hard and dark purple, please call Dr. Scott or his office immediately. Mild bruising will usually dissipate in several weeks. In certain patients it may require 6 months for all swelling to subside completely. Be aware that when you expose a bruise to sunlight it can cause permanent discoloration to the skin. Please wear a hat and use sunscreen when exposed to sunlight (SPF of 30 or greater is recommended).     Discomfort: There will be discomfort after surgery, most of which will be within 1-2 days after the procedure. After the first 48 hours, your pain should slowly start to improve. If after this time you experience significantly worsening pain, please call Dr. Scott's office or Dr. Scott immediately.    Redness: The surgery site may remain pink for one year as new blood vessels grow into the area to help it heal. This redness will likely fade over time.     Numbness: It is normal to experience some numbness near the incisions. This could last several months but usually resolves over time.    Buried Sutures: On occasion a suture under the skin can resurface a month or two later. If this happens, please call and we will schedule an appointment to have it removed.    Tightness: Your surgical site may feel tight after the procedure. On  average, it takes 1 to 6 months for the skin to start to relax, and one complete year for full relaxation of the skin. This will vary with each patient.    Please call the office or Dr. Scott directly if you have a fever over 101?F, excessive swelling, vision changes, severe pain, excessive bleeding, any other unexpected problem, or an injury to your surgery area.    Follow-up Appointment: please call    [] [phone] or [phone] for an appointment at [facility] [place] 4th [facility] with       [] Dr. Scott or ENT Physician Assistant (PA) on _____________      [] Dr. Scott or ENT Physician Assistant (PA) on _____________    [x] [phone] or [phone] for an appointment at [facility] with       [x] Dr. Scott or ENT Nurse in about 1 week.      [] Dr. Scott or ENT Nurse on _____________      Please contact our office or me anytime if you have questions or if I can be of service.    Sincerely,    Ruiz Scott MD    If you have questions or concerns during business hours, please call the office where you had surgery:  [facility], ENT Clinic: [phone].  [facility]: [phone].    If it is an emergency or after business hours and you need to talk with Dr. Scott, please call:  Dr. Scott's cell phone: [phone]. I will not be able to answer it if I am operating. You can leave a message for me to call you back when I am available. If I don't answer and you need help right away, call the pager    Pager : [phone], ask for Dr. Scott to be paged. If Dr. Scott not available, ask to talk to the ENT resident  on call.

## 2023-09-14 NOTE — BRIEF OP NOTE
[facility] Brief Operative Note    Pre-operative diagnosis: Nasal obstruction [J34.89]   Post-operative diagnosis left nasal wound s/p stage 1 reconstruction    Procedure: Procedure(s):  stage 2 left nasal reconstruction with wound preparation, local flaps, ear cartilage graft from left ear, lateral nasal wall reconstruction, stent placement,   Surgeon(s): Surgeon(s) and Role:     * Ruiz Scott MD - Primary   Estimated blood loss: 25mL    Specimens: ID Type Source Tests Collected by Time Destination   1 : Left superior melolabial crease skin and scar Tissue Cheek, Left SURGICAL PATHOLOGY EXAM Ruiz Scott MD 9/14/2023  9:38 AM    2 : Left nasal alar posterior skin and scar Tissue Nose SURGICAL PATHOLOGY EXAM Ruiz Scott MD 9/14/2023  9:39 AM    3 : Left nasal alar inferior skin and scar Tissue Nose SURGICAL PATHOLOGY EXAM Ruiz Scott MD 9/14/2023  9:41 AM    4 : Left nasal alar anterior ski and scar Tissue Nose SURGICAL PATHOLOGY EXAM Ruiz Scott MD 9/14/2023  9:43 AM    5 : Left nasal sidewall superior skin and scar Tissue Nose SURGICAL PATHOLOGY EXAM Ruiz Scott MD 9/14/2023  9:44 AM    6 : Left nasal alar deep tissue Tissue Nose SURGICAL PATHOLOGY EXAM Ruiz Scott MD 9/14/2023  9:53 AM    7 : Left nasal sidewall superior standing cone Tissue Nose SURGICAL PATHOLOGY EXAM Ruiz Scott MD 9/14/2023 11:20 AM       Findings: Viable tissue

## 2023-09-14 NOTE — ANESTHESIA POSTPROCEDURE EVALUATION
Patient: Kel Hernandez    Procedure: Procedure(s):  stage 2 left nasal reconstruction with wound preparation, local flaps, ear cartilage graft from left ear, lateral nasal wall reconstruction, stent placement,       Anesthesia Type:  General    Note:  Disposition: Outpatient   Postop Pain Control: Uneventful            Sign Out: Well controlled pain   PONV: No   Neuro/Psych: Uneventful            Sign Out: Acceptable/Baseline neuro status   Airway/Respiratory: Uneventful            Sign Out: Acceptable/Baseline resp. status   CV/Hemodynamics: Uneventful            Sign Out: Acceptable CV status; No obvious hypovolemia; No obvious fluid overload   Other NRE:             Error/ Injury: Wrong dose   DID A NON-ROUTINE EVENT OCCUR? YES    Event details/Postop Comments:  Ancef supposed to be Q6h due to renal clearance but given 2 hours early for one dose. Discussed with [facility] pharmacy and due to weight would not expect any issues and should be able to take outpatient antibiotics as normal. Disclosed to patient and wife.            Last vitals:  Vitals Value Taken Time   /70 09/14/23 1231   Temp 99  F (37.2  C) 09/14/23 1228   Pulse 73 09/14/23 1243   Resp 14 09/14/23 1243   SpO2 90 % 09/14/23 1243   Vitals shown include unvalidated device data.    Electronically Signed By: Dewayne Ontiveros MD  September 14, 2023  1:43 PM

## 2023-09-14 NOTE — ANESTHESIA CARE TRANSFER NOTE
Patient: Kel Hernandez    Procedure: Procedure(s):  stage 2 left nasal reconstruction with wound preparation, local flaps, ear cartilage graft from left ear, lateral nasal wall reconstruction, stent placement,       Diagnosis: Nasal obstruction [J34.89]  Diagnosis Additional Information: No value filed.    Anesthesia Type:   General     Note:    Oropharynx: oropharynx clear of all foreign objects  Level of Consciousness: drowsy  Oxygen Supplementation: face mask  Level of Supplemental Oxygen (L/min / FiO2): 8  Independent Airway: airway patency satisfactory and stable  Dentition: dentition unchanged  Vital Signs Stable: post-procedure vital signs reviewed and stable  Report to RN Given: handoff report given  Patient transferred to: PACU  Comments: Anesthesia Care Transfer Note    Patient: Kel Hernandez    Transferred to: PACU with supplemental O2    Patient vital signs: stable    Airway: none    Monitors on, VSS, breathing spontaneously, report to RN      Nurys Bui CRNA   9/14/2023 12:32 PM   Handoff Report: Identifed the Patient, Identified the Reponsible Provider, Reviewed the pertinent medical history, Discussed the surgical course, Reviewed Intra-OP anesthesia mangement and issues during anesthesia, Set expectations for post-procedure period and Allowed opportunity for questions and acknowledgement of understanding      Vitals:  Vitals Value Taken Time   BP 99/58 09/14/23 1228   Temp 99  F (37.2  C) 09/14/23 1228   Pulse 98 09/14/23 1231   Resp 19 09/14/23 1231   SpO2 97 % 09/14/23 1231   Vitals shown include unvalidated device data.    Electronically Signed By: SANCHEZ Mccord CRNA  September 14, 2023  12:32 PM

## 2023-09-14 NOTE — ANESTHESIA PROCEDURE NOTES
Airway       Patient location during procedure: OR       Procedure Start/Stop Times: 9/14/2023 7:26 AM  Staff -        Anesthesiologist:  Dewayne Ontiveros MD       CRNA: Nurys Byers APRN CRNA       Performed By: CRNA and anesthesiologist  Consent for Airway        Urgency: elective  Indications and Patient Condition       Indications for airway management: bakari-procedural       Induction type:intravenous       Mask difficulty assessment: 2 - vent by mask + OA or adjuvant +/- NMBA (easy 2 handed 2 person)    Final Airway Details       Final airway type: endotracheal airway       Successful airway: Oral and GIL  Endotracheal Airway Details        ETT size (mm): 8.0       Cuffed: yes       Successful intubation technique: video laryngoscopy       VL Blade Size: Glidescope 4 (lo pro)       Grade View of Cords: 1       Adjucts: stylet       Position: Center       Measured from: lips       Bite block used: None    Post intubation assessment        Placement verified by: capnometry, equal breath sounds and chest rise        Number of attempts at approach: 1       Number of other approaches attempted: 0       Secured with: silk tape       Ease of procedure: easy       Dentition: Intact    Medication(s) Administered   Medication Administration Time: 9/14/2023 7:26 AM

## 2023-09-18 NOTE — PROGRESS NOTES
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 Garcia.   -5/4/23: stage 1- superiorly based melolabial flap, intranasal skin graft (donor = flap standing cutaneous deformity), placement of left nasal stent (path = Negative for malignancy).  -9/14/23: stage 2- skin and scar excision, lateral nasal wall reconstruction, placement of a conchal cartilage alar batten graft from left ear donor, nasal alar advancement flap (path = negative for carcinoma).  Medical Decision Making (MDM) (acute complicated injury because of the full-thickness component of his wound): Recovering appropriately without complication.  Care Checklist:  _Anticipate the need for additional stages of surgery to complete the reconstructive process to include: Stage 3- supra alar crease reconstruction because the thickness of the flap is expected to obliterate this facial landmark. And any additional procedures to address flap thickness or scar widening.   _RTC 1 week for stent removal.  _Pain 0/10. Pain management: Tylenol.  _Was oozy during his stage 2 surgery. Consider booking additional time if we perform stage 3 surgery.      2.) Left nasal obstruction from the following anatomic causes: loss of nasal support framework after cancer resection  Medical Decision Making (acute complicated injury): The skin cancer involved deep structures of the nose and resection of those structures destabilized the nasal support framework resulting in left dynamic nasal valve collapse and resulting nasal obstruction. The lateral nasal wall was reconstructed as described above. Given the size of the intranasal wound, he is at significant risk for scar and skin graft contracture. Nasal stenting is medically necessary to counteract the contractile forces of his scar and skin graft that reconstructed  his mucosal lining. I am electing to keep the stent sutured in place for another week because removing and replacing the stent could create more harm than good at this early stage.   Care Checklist:  _Continue nasal stent use on the left side. Stent exchanges on 5/24/23, 6/22/23, 9/14/23. Next stent change on 9/28/23    _Nasal stent care: wash stent breathing port with saline followed by placing mupirocin around the outside of the stent where the stent touches the nostril skin.      3.) Comorbidities that increase the risk of surgery: Kidney transplant 15 years ago with immunosuppression, bilateral hip replacement, no NSAIDS because of kidney transplant. Aspirin.   MDM: (stable chronic problem or illness).   We discussed the patients comorbidities listed above increase the risks associated with any procedure and recommendations to mitigate those risks are listed in the care checklist below.   Care Checklist:  _No NSAIDs for postoperative pain because of kidney transplant.   _Anticoagulation plan with nephrologist whether he can stop ASA prior to surgery and how long he can remain off postop. Prior anticoagulation plan included being off ASA 7 days preop and 7 days postop. Update 9/21/23: OK to restart ASA.   _Should not take Lisinopril on the day of any future surgeries because it makes his blood pressure harder to manage under anesthesia.   _Renal dosing for postoperative medications: Oxycodone 2.5mg q8hrs, Levofloxacin 750 mg IV/PO q 48 hours, Ancef (cefazolin) 2 gm q 24 hours, Keflex (cephalexin) 500 mg q 12 hours. If we consider surgery in the future, as nephrologist for adjusted antibiotic dosing.   _Needs anesthesia review before operating again in [facility] because his kidney function could change. Also, Dr. Ontiveros recommended: repeat BMP prior to scheduling as well as another BMP the day of surgery.     Background/Connection:   Preferred name = Jose D  What is most important to know about you as a person? (No  medical information) Retired. . Planning on [place] time in [place] and [place].      Photographs: [facility] consents signed 4/20/23.    Ruiz Scott MD             INSTRUCTIONS     _Nasal stent care: wash stent breathing port with saline followed by placing mupirocin around the outside of the stent where the stent touches the nostril skin.       You can SHOWER and get the reconstruction WET. Do not scrub the reconstruction or any incisions. After your shower, gently pat the reconstructed area dry with a clean towel and apply more Vaseline. Areas with a skin graft must stay dry for 2 weeks after surgery.   LIMIT ACTIVITIES for 1 MONTH after surgery that would raise your blood pressure (bending over, heavy lifting, or strenuous activity) in order to avoid bleeding under your reconstructed skin. One month after surgery, you can gradually resume exercise and strenuous activity, starting slow at first and increasing to full activities over two weeks.  CONTACT US at [phone] if your pain suddenly worsens, the skin bubbles up, becomes hard, turns red, leaks foul smelling fluid, or darkens/bruises suddenly, there is uncontrollable bleeding, you have a fever, or if you have questions or if other problems arise. If it is a serious matter requiring immediate attention, please call 911 or go to your nearest emergency department.    HISTORY     Today I had the pleasure of seeing the patient at the [facility] Clinic in the Department of Otolaryngology at [facility]. He follows up after undergoing reconstruction.     How is your healing is going? Going good. As I expected.     Any concerns about the appearance of the surgery site? No.     Any functional impairment such as nasal congestion, difficulty closing eyelids, or hearing loss? YES. Slight.    0/10: How would you rate your pain at this time?   No: Significant bleeding that has not stopped?  No:  "Discharge from your wounds?  No: Fevers?  No: Has your health changed since surgery?    How is wearing the stent? \"Uncomfortable, but a necessary evil. I have gotten used to it because I have worn it for a long time. I would much rather have this compared to the old stents you described.\"    Are you able to breathe through the breathing port(s) of your stent? \"Yes. But not perfectly. It requires maintenance.\"     How many hours a day do you wear the stent? 24 hours a day. It is sutured in.    How do you clean or maintain your stent? Clean the inside of the stent with q-tip. I use saline solution to spray into the port. I try not to be too aggressive with it.     Nasal Obstruction Symptom Evaluation (NOSE QUESTIONNAIRE):     1 - a very mild problem: Nasal congestion or stuffiness?  0 - not a problem: Nasal blockage or obstruction?  1 - a very mild problem: Trouble breathing through his nose?  1 - a very mild problem: Trouble sleeping?  1 - a very mild problem: Inability to get enough air through his nose during exercise or exertion?    4 = Total NOSE score    PHYSICAL EXAM     Tissue viability at reconstruction site(s)-   100% of the reconstructed tissue appears viable with appropriate color and capillary refill.    Complications at reconstruction site(s)-  No: Bleeding  No: Evidence of hematoma  No: Evidence of infection  No: Wound more tender than expected   No: Wound dehiscence  No: Scar widening  No: Facial nerve weakness  No: Dryness and crusting of the skin  No: Intranasal crusting  No: Non viable portions of the intranasal skin graft  No: Satellite areas of redness (separate and distinct red spots beyond the usual redness at the edges of every reconstruction site.)   No: Other complications at the reconstruction site    Shape of the reconstructed nasal cavity: unable to assess because the stent is sutured in place.  Shape of the external nasal valve: supported by the stent.    Function near the reconstruction " site(s)-  No: Evidence of functional impairment near the reconstruction site such as nasal obstruction.    Donor site(s) Left ear-  No: Evidence of complication at the donor site  General: no apparent distress. Pleasant affect. Normal ability to communicate.   Respiratory: normal respiratory effort. No stridor. Voice strong.      Stent-  YES: Crusting of the stent. Very minimal soft crusts.  No: Malposition of the stent  No: Fracture or discoloration of the stent  No: Intranasal or external pressure sores from stent No external nasal sores. Unable to assess for intranasal sores because the stent is sutured in place.     Ease of stent removal and replacement: unable to assess.       SIGNATURE   Ruiz Scott MD       none

## 2023-09-19 PROCEDURE — 88304 TISSUE EXAM BY PATHOLOGIST: CPT | Mod: XU | Performed by: STUDENT IN AN ORGANIZED HEALTH CARE EDUCATION/TRAINING PROGRAM

## 2023-09-19 PROCEDURE — 88304 TISSUE EXAM BY PATHOLOGIST: CPT | Performed by: STUDENT IN AN ORGANIZED HEALTH CARE EDUCATION/TRAINING PROGRAM

## 2023-09-21 ENCOUNTER — OFFICE VISIT (OUTPATIENT)
Dept: OTOLARYNGOLOGY | Facility: CLINIC | Age: 63
End: 2023-09-21
Payer: MEDICARE

## 2023-09-21 VITALS — HEART RATE: 59 BPM | DIASTOLIC BLOOD PRESSURE: 80 MMHG | SYSTOLIC BLOOD PRESSURE: 147 MMHG

## 2023-09-21 DIAGNOSIS — Z98.890 MOHS DEFECT OF LEFT NOSE: Primary | ICD-10-CM

## 2023-09-21 DIAGNOSIS — M95.0 MOHS DEFECT OF LEFT NOSE: Primary | ICD-10-CM

## 2023-09-21 PROCEDURE — 99024 POSTOP FOLLOW-UP VISIT: CPT | Performed by: OTOLARYNGOLOGY

## 2023-09-21 NOTE — LETTER
9/21/2023         RE: Kel Hernandez  [address] [facility]  [place] [address]        Dear Colleague,    Thank you for referring your patient, Kel Hernandez, to the [facility]. Please see a copy of my visit note below.    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 Garcia.   -5/4/23: stage 1- superiorly based melolabial flap, intranasal skin graft (donor = flap standing cutaneous deformity), placement of left nasal stent (path = Negative for malignancy).  -9/14/23: stage 2- skin and scar excision, lateral nasal wall reconstruction, placement of a conchal cartilage alar batten graft from left ear donor, nasal alar advancement flap (path = negative for carcinoma).  Medical Decision Making (MDM) (acute complicated injury because of the full-thickness component of his wound): Recovering appropriately without complication.  Care Checklist:  _Anticipate the need for additional stages of surgery to complete the reconstructive process to include: Stage 3- supra alar crease reconstruction because the thickness of the flap is expected to obliterate this facial landmark. And any additional procedures to address flap thickness or scar widening.   _RTC 1 week for stent removal.  _Pain 0/10. Pain management: Tylenol.  _Was oozy during his stage 2 surgery. Consider booking additional time if we perform stage 3 surgery.      2.) Left nasal obstruction from the following anatomic causes: loss of nasal support framework after cancer resection  Medical Decision Making (acute complicated injury): The skin cancer involved deep structures of the nose and resection of those structures destabilized the nasal support framework resulting in left dynamic nasal valve collapse and resulting nasal obstruction. The lateral nasal wall was  reconstructed as described above. Given the size of the intranasal wound, he is at significant risk for scar and skin graft contracture. Nasal stenting is medically necessary to counteract the contractile forces of his scar and skin graft that reconstructed his mucosal lining. I am electing to keep the stent sutured in place for another week because removing and replacing the stent could create more harm than good at this early stage.   Care Checklist:  _Continue nasal stent use on the left side. Stent exchanges on 5/24/23, 6/22/23, 9/14/23. Next stent change on 9/28/23    _Nasal stent care: wash stent breathing port with saline followed by placing mupirocin around the outside of the stent where the stent touches the nostril skin.      3.) Comorbidities that increase the risk of surgery: Kidney transplant 15 years ago with immunosuppression, bilateral hip replacement, no NSAIDS because of kidney transplant. Aspirin.   MDM: (stable chronic problem or illness).   We discussed the patients comorbidities listed above increase the risks associated with any procedure and recommendations to mitigate those risks are listed in the care checklist below.   Care Checklist:  _No NSAIDs for postoperative pain because of kidney transplant.   _Anticoagulation plan with nephrologist whether he can stop ASA prior to surgery and how long he can remain off postop. Prior anticoagulation plan included being off ASA 7 days preop and 7 days postop. Update 9/21/23: OK to restart ASA.   _Should not take Lisinopril on the day of any future surgeries because it makes his blood pressure harder to manage under anesthesia.   _Renal dosing for postoperative medications: Oxycodone 2.5mg q8hrs, Levofloxacin 750 mg IV/PO q 48 hours, Ancef (cefazolin) 2 gm q 24 hours, Keflex (cephalexin) 500 mg q 12 hours. If we consider surgery in the future, as nephrologist for adjusted antibiotic dosing.   _Needs anesthesia review before operating again in [facility]  because his kidney function could change. Also, Dr. Ontiveros recommended: repeat BMP prior to scheduling as well as another BMP the day of surgery.     Background/Connection:   Preferred name = Jose D  What is most important to know about you as a person? (No medical information) Retired. . Planning on [place] in [place] and [place].      Photographs:  consents signed 4/20/23.    Ruiz Scott MD             INSTRUCTIONS     _Nasal stent care: wash stent breathing port with saline followed by placing mupirocin around the outside of the stent where the stent touches the nostril skin.       You can SHOWER and get the reconstruction WET. Do not scrub the reconstruction or any incisions. After your shower, gently pat the reconstructed area dry with a clean towel and apply more Vaseline. Areas with a skin graft must stay dry for 2 weeks after surgery.   LIMIT ACTIVITIES for 1 MONTH after surgery that would raise your blood pressure (bending over, heavy lifting, or strenuous activity) in order to avoid bleeding under your reconstructed skin. One month after surgery, you can gradually resume exercise and strenuous activity, starting slow at first and increasing to full activities over two weeks.  CONTACT US at [phone] if your pain suddenly worsens, the skin bubbles up, becomes hard, turns red, leaks foul smelling fluid, or darkens/bruises suddenly, there is uncontrollable bleeding, you have a fever, or if you have questions or if other problems arise. If it is a serious matter requiring immediate attention, please call 911 or go to your nearest emergency department.    HISTORY     Today I had the pleasure of seeing the patient at the Facial Plastic and Re[facility]ive Surgery Clinic in the Department of Otolaryngology at [facility]. He follows up after undergoing reconstruction.     How is your healing is going? Going good. As I expected.     Any concerns about the  "appearance of the surgery site? No.     Any functional impairment such as nasal congestion, difficulty closing eyelids, or hearing loss? YES. Slight.    0/10: How would you rate your pain at this time?   No: Significant bleeding that has not stopped?  No: Discharge from your wounds?  No: Fevers?  No: Has your health changed since surgery?    How is wearing the stent? \"Uncomfortable, but a necessary evil. I have gotten used to it because I have worn it for a long time. I would much rather have this compared to the old stents you described.\"    Are you able to breathe through the breathing port(s) of your stent? \"Yes. But not perfectly. It requires maintenance.\"     How many hours a day do you wear the stent? 24 hours a day. It is sutured in.    How do you clean or maintain your stent? Clean the inside of the stent with q-tip. I use saline solution to spray into the port. I try not to be too aggressive with it.     Nasal Obstruction Symptom Evaluation (NOSE QUESTIONNAIRE):     1 - a very mild problem: Nasal congestion or stuffiness?  0 - not a problem: Nasal blockage or obstruction?  1 - a very mild problem: Trouble breathing through his nose?  1 - a very mild problem: Trouble sleeping?  1 - a very mild problem: Inability to get enough air through his nose during exercise or exertion?    4 = Total NOSE score    PHYSICAL EXAM     Tissue viability at reconstruction site(s)-   100% of the reconstructed tissue appears viable with appropriate color and capillary refill.    Complications at reconstruction site(s)-  No: Bleeding  No: Evidence of hematoma  No: Evidence of infection  No: Wound more tender than expected   No: Wound dehiscence  No: Scar widening  No: Facial nerve weakness  No: Dryness and crusting of the skin  No: Intranasal crusting  No: Non viable portions of the intranasal skin graft  No: Satellite areas of redness (separate and distinct red spots beyond the usual redness at the edges of every reconstruction " site.)   No: Other complications at the reconstruction site    Shape of the reconstructed nasal cavity: unable to assess because the stent is sutured in place.  Shape of the external nasal valve: supported by the stent.    Function near the reconstruction site(s)-  No: Evidence of functional impairment near the reconstruction site such as nasal obstruction.    Donor site(s) Left ear-  No: Evidence of complication at the donor site  General: no apparent distress. Pleasant affect. Normal ability to communicate.   Respiratory: normal respiratory effort. No stridor. Voice strong.      Stent-  YES: Crusting of the stent. Very minimal soft crusts.  No: Malposition of the stent  No: Fracture or discoloration of the stent  No: Intranasal or external pressure sores from stent No external nasal sores. Unable to assess for intranasal sores because the stent is sutured in place.     Ease of stent removal and replacement: unable to assess.       SIGNATURE   Ruiz Scott MD             Again, thank you for allowing me to participate in the care of your patient.        Sincerely,        Ruiz Scott MD

## 2023-09-21 NOTE — PATIENT INSTRUCTIONS
_Nasal stent care: wash stent breathing port with saline followed by placing mupirocin around the outside of the stent where the stent touches the nostril skin.       You can SHOWER and get the reconstruction WET. Do not scrub the reconstruction or any incisions. After your shower, gently pat the reconstructed area dry with a clean towel and apply more Vaseline. Areas with a skin graft must stay dry for 2 weeks after surgery.   LIMIT ACTIVITIES for 1 MONTH after surgery that would raise your blood pressure (bending over, heavy lifting, or strenuous activity) in order to avoid bleeding under your reconstructed skin. One month after surgery, you can gradually resume exercise and strenuous activity, starting slow at first and increasing to full activities over two weeks.  CONTACT US at [phone] if your pain suddenly worsens, the skin bubbles up, becomes hard, turns red, leaks foul smelling fluid, or darkens/bruises suddenly, there is uncontrollable bleeding, you have a fever, or if you have questions or if other problems arise. If it is a serious matter requiring immediate attention, please call 911 or go to your nearest emergency department.

## 2023-09-21 NOTE — NURSING NOTE
Kel Hernandez's chief complaint for this visit includes:  Chief Complaint   Patient presents with    Surgical Followup     1 week stage 2 left nasal reconstruction with wound preparation, local flaps, ear cartilage graft from left ear, lateral nasal wall reconstruction, stent placement, DOS 9-14-23       PCP:     Referring Provider:  No referring provider defined for this encounter.    BP (!) 147/80   Pulse 59

## 2023-09-22 NOTE — CONFIDENTIAL NOTE
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 Garcia.   -5/4/23: stage 1- superiorly based melolabial flap, intranasal skin graft (donor = flap standing cutaneous deformity), placement of left nasal stent (path = Negative for malignancy).  -9/14/23: stage 2- skin and scar excision, lateral nasal wall reconstruction, placement of a conchal cartilage alar batten graft from left ear donor, nasal alar advancement flap (path = negative for carcinoma).  Medical Decision Making (MDM) (acute complicated injury because of the full-thickness component of his wound): Recovering appropriately without complication.  Care Checklist:  _Anticipate the need for additional stages of surgery to complete the reconstructive process to include: Stage 3- supra alar crease reconstruction because the thickness of the flap is expected to obliterate this facial landmark. And any additional procedures to address flap thickness or scar widening.   _RTC 1 week for stent removal.  _Pain 0/10. Pain management: Tylenol.  _Was oozy during his stage 2 surgery. Consider booking additional time if we perform stage 3 surgery.      2.) Left nasal obstruction from the following anatomic causes: loss of nasal support framework after cancer resection  Medical Decision Making (acute complicated injury): The skin cancer involved deep structures of the nose and resection of those structures destabilized the nasal support framework resulting in left dynamic nasal valve collapse and resulting nasal obstruction. The lateral nasal wall was reconstructed as described above. Given the size of the intranasal wound, he is at significant risk for scar and skin graft contracture. Nasal stenting is medically necessary to counteract the contractile forces of his scar and skin graft that reconstructed his mucosal lining. I am electing to keep  the stent sutured in place for another week because removing and replacing the stent could create more harm than good at this early stage.   Care Checklist:  _Continue nasal stent use on the left side. Stent exchanges on 5/24/23, 6/22/23, 9/14/23. Next stent change on 9/28/23    _Nasal stent care: wash stent breathing port with saline followed by placing mupirocin around the outside of the stent where the stent touches the nostril skin.      3.) Comorbidities that increase the risk of surgery: Kidney transplant 15 years ago with immunosuppression, bilateral hip replacement, no NSAIDS because of kidney transplant. Aspirin.   MDM: (stable chronic problem or illness).   We discussed the patients comorbidities listed above increase the risks associated with any procedure and recommendations to mitigate those risks are listed in the care checklist below.   Care Checklist:  _No NSAIDs for postoperative pain because of kidney transplant.   _Anticoagulation plan with nephrologist whether he can stop ASA prior to surgery and how long he can remain off postop. Prior anticoagulation plan included being off ASA 7 days preop and 7 days postop. Update 9/21/23: OK to restart ASA.   _Should not take Lisinopril on the day of any future surgeries because it makes his blood pressure harder to manage under anesthesia.   _Renal dosing for postoperative medications: Oxycodone 2.5mg q8hrs, Levofloxacin 750 mg IV/PO q 48 hours, Ancef (cefazolin) 2 gm q 24 hours, Keflex (cephalexin) 500 mg q 12 hours. If we consider surgery in the future, as nephrologist for adjusted antibiotic dosing.   _Needs anesthesia review before operating again in [facility] because his kidney function could change. Also, Dr. Ontiveros recommended: repeat BMP prior to scheduling as well as another BMP the day of surgery.     Background/Connection:   Preferred name = Jose D  What is most important to know about you as a person? (No medical information) Retired. .  [place]anning [place] in [place].      Photographs:  consents signed 4/20/23.

## 2023-09-22 NOTE — PROGRESS NOTES
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 Garcia.   -5/4/23: stage 1- superiorly based melolabial flap, intranasal skin graft (donor = flap standing cutaneous deformity), placement of left nasal stent (path = Negative for malignancy).  -9/14/23: stage 2- skin and scar excision, lateral nasal wall reconstruction, placement of a conchal cartilage alar batten graft from left ear donor, nasal alar advancement flap (path = negative for carcinoma).  Medical Decision Making (MDM) (acute complicated injury because of the full-thickness component of his wound): Recovering appropriately without complication.  Care Checklist:  _Anticipate the need for additional stages of surgery to complete the reconstructive process to include: Stage 3- supra alar crease reconstruction because the thickness of the flap is expected to obliterate this facial landmark. And any additional procedures to address flap thickness or scar widening.   _RTC next time he is in town (around [facility]).   _Pain 0/10. Pain management: Tylenol.  _Was oozy during his stage 2 surgery. Consider booking additional time if we perform stage 3 surgery.      2.) Left nasal obstruction from the following anatomic causes: loss of nasal support framework after cancer resection  Medical Decision Making (acute complicated injury): The skin cancer involved deep structures of the nose and resection of those structures destabilized the nasal support framework resulting in left dynamic nasal valve collapse and resulting nasal obstruction. The lateral nasal wall was reconstructed as described above. Given the size of the intranasal wound, he is at significant risk for scar and skin graft contracture. Nasal stenting is medically necessary to counteract the contractile forces of his scar and skin graft that reconstructed his mucosal lining.    Care Checklist:  _Continue nasal stent use on the left side. Stent exchanges on 5/24/23, 6/22/23, 9/14/23, 9/28/23. Next stent change before 10/28/23    _Nasal stent care: remove the stent one time per day. Wash the stent out until clean. Also wash out your nostril with nasal saline and then put vaseline into your nostril. You can leave the stent out for 30 minutes per day. Coat the stent with vaseline when you put it back into your nose. Wear the stent as much as possible for another 2 weeks. Two weeks from now, start increasing the time the stent is out 1 hour per day until reaching the goal of wearing the stent for 4 hours per day. Use the stent for 4 hours a day for a week. Then you can stop using the stent. Use a strap if you notice the stent being slowly pushed out or if the fit changes. If you have pain or can't get the stent in or if the fit changes, call Dr. Scott right away.      3.) Comorbidities that increase the risk of surgery: Kidney transplant 15 years ago with immunosuppression, bilateral hip replacement, no NSAIDS because of kidney transplant. Aspirin.   MDM: (stable chronic problem or illness).   We discussed the patients comorbidities listed above increase the risks associated with any procedure and recommendations to mitigate those risks are listed in the care checklist below.   Care Checklist:  _No NSAIDs for postoperative pain because of kidney transplant.   _Anticoagulation plan with nephrologist whether he can stop ASA prior to surgery and how long he can remain off postop. Prior anticoagulation plan included being off ASA 7 days preop and 7 days postop. Update 9/21/23: Restarted ASA without complications.   _Should not take Lisinopril on the day of any future surgeries because it makes his blood pressure harder to manage under anesthesia.   _Renal dosing for postoperative medications: Oxycodone 2.5mg q8hrs, Levofloxacin 750 mg IV/PO q 48 hours, Ancef (cefazolin) 2 gm q 24 hours, Keflex  (cephalexin) 500 mg q 12 hours. If we consider surgery in the future, as nephrologist for adjusted antibiotic dosing.   _Needs anesthesia review before operating again in [facility] because his kidney function could change. Also, Dr. Ontiveros recommended: repeat BMP prior to scheduling as well as another BMP the day of surgery.     Background/Connection:   Preferred name = Jose D  What is most important to know about you as a person? (No medical information) Retired. . Planning on [place] time in [place] and [place].      Photographs: [facility] consents signed 4/20/23.    Ruiz Scott MD         Postoperative Reconstruction Follow-Up            HISTORY     Today I had the pleasure of seeing the patient at the [facility] Surgery Clinic in the Department of Otolaryngology at [facility]. He follows up after undergoing reconstruction.     How is your healing is going? Good. Progressing for sure.     Any concerns about the appearance of the surgery site? No.     Any functional impairment such as nasal congestion, difficulty closing eyelids, or hearing loss? No.     0/10: How would you rate your pain at this time?   No: Significant bleeding that has not stopped?  No: Discharge from your wounds?  No: Fevers?  No: Has your health changed since surgery?      How is wearing the stent? Uncomfortable.     Are you able to breathe through the breathing port(s) of your stent? Yes when it is cleaned out.    How many hours a day do you wear the stent? 24 hours a day because it was sewn in.    How do you clean or maintain your stent? Take out and wash in susy soap.     Nasal Obstruction Symptom Evaluation (NOSE QUESTIONNAIRE): WITH the stent OUT.     0 - not a problem: Nasal congestion or stuffiness?  1 - a very mild problem: Nasal blockage or obstruction?  0 - not a problem: Trouble breathing through his nose?  1 - a very mild problem: Trouble sleeping? (Increases to a 2 with  the stent IN because it gets snagged sometimes in bed with sleeping.   0 - not a problem: Inability to get enough air through his nose during exercise or exertion?    2 = Total NOSE score    PHYSICAL EXAM     Tissue viability at reconstruction site(s)-   100% of the reconstructed tissue appears viable with appropriate color and capillary refill.    Complications at reconstruction site(s)-  No: Bleeding  No: Evidence of hematoma  No: Evidence of infection  No: Wound more tender than expected   No: Wound dehiscence  No: Scar widening  No: Facial nerve weakness  No: Dryness and crusting of the skin  No: Intranasal crusting  No: Non viable portions of the intranasal skin graft  No: Satellite areas of redness (separate and distinct red spots beyond the usual redness at the edges of every reconstruction site.)   No: Other complications at the reconstruction site    Shape of the reconstructed nasal cavity: widely patent  Shape of the external nasal valve: widely patent    Function near the reconstruction site(s)-  No: Evidence of functional impairment near the reconstruction site such as nasal obstruction.    Donor site(s)-  No: Evidence of complication at the donor site  General: no apparent distress. Pleasant affect. Normal ability to communicate.   Respiratory: normal respiratory effort. No stridor. Voice strong.      Stent-  YES: Crusting of the stent  No: Malposition of the stent  No: Fracture or discoloration of the stent  No: Intranasal or external pressure sores from stent  Sutured in stent and opposite side septal sheeting was removed and new stent was replaced.     Ease of stent removal and replacement: easy.       SIGNATURE   Ruiz Scott MD

## 2023-09-28 ENCOUNTER — OFFICE VISIT (OUTPATIENT)
Dept: OTOLARYNGOLOGY | Facility: CLINIC | Age: 63
End: 2023-09-28
Payer: MEDICARE

## 2023-09-28 VITALS — HEART RATE: 71 BPM | SYSTOLIC BLOOD PRESSURE: 156 MMHG | DIASTOLIC BLOOD PRESSURE: 78 MMHG

## 2023-09-28 DIAGNOSIS — Z98.890 MOHS DEFECT OF LEFT NOSE: Primary | ICD-10-CM

## 2023-09-28 DIAGNOSIS — M95.0 MOHS DEFECT OF LEFT NOSE: Primary | ICD-10-CM

## 2023-09-28 PROCEDURE — 99024 POSTOP FOLLOW-UP VISIT: CPT | Performed by: OTOLARYNGOLOGY

## 2023-09-28 NOTE — LETTER
9/28/2023         RE: Kel Hernandez  [address]  [place] [address]        Dear Colleague,    Thank you for referring your patient, Kel Hernandez, to the [facility]. Please see a copy of my visit note below.    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 Garcia.   -5/4/23: stage 1- superiorly based melolabial flap, intranasal skin graft (donor = flap standing cutaneous deformity), placement of left nasal stent (path = Negative for malignancy).  -9/14/23: stage 2- skin and scar excision, lateral nasal wall reconstruction, placement of a conchal cartilage alar batten graft from left ear donor, nasal alar advancement flap (path = negative for carcinoma).  Medical Decision Making (MDM) (acute complicated injury because of the full-thickness component of his wound): Recovering appropriately without complication.  Care Checklist:  _Anticipate the need for additional stages of surgery to complete the reconstructive process to include: Stage 3- supra alar crease reconstruction because the thickness of the flap is expected to obliterate this facial landmark. And any additional procedures to address flap thickness or scar widening.   _RTC next time he is in town (around [facility]).   _Pain 0/10. Pain management: Tylenol.  _Was oozy during his stage 2 surgery. Consider booking additional time if we perform stage 3 surgery.      2.) Left nasal obstruction from the following anatomic causes: loss of nasal support framework after cancer resection  Medical Decision Making (acute complicated injury): The skin cancer involved deep structures of the nose and resection of those structures destabilized the nasal support framework resulting in left dynamic nasal valve collapse and resulting nasal obstruction. The lateral nasal wall was reconstructed as described  above. Given the size of the intranasal wound, he is at significant risk for scar and skin graft contracture. Nasal stenting is medically necessary to counteract the contractile forces of his scar and skin graft that reconstructed his mucosal lining.   Care Checklist:  _Continue nasal stent use on the left side. Stent exchanges on 5/24/23, 6/22/23, 9/14/23, 9/28/23. Next stent change before 10/28/23    _Nasal stent care: remove the stent one time per day. Wash the stent out until clean. Also wash out your nostril with nasal saline and then put vaseline into your nostril. You can leave the stent out for 30 minutes per day. Coat the stent with vaseline when you put it back into your nose. Wear the stent as much as possible for another 2 weeks. Two weeks from now, start increasing the time the stent is out 1 hour per day until reaching the goal of wearing the stent for 4 hours per day. Use the stent for 4 hours a day for a week. Then you can stop using the stent. Use a strap if you notice the stent being slowly pushed out or if the fit changes. If you have pain or can't get the stent in or if the fit changes, call Dr. Scott right away.      3.) Comorbidities that increase the risk of surgery: Kidney transplant 15 years ago with immunosuppression, bilateral hip replacement, no NSAIDS because of kidney transplant. Aspirin.   MDM: (stable chronic problem or illness).   We discussed the patients comorbidities listed above increase the risks associated with any procedure and recommendations to mitigate those risks are listed in the care checklist below.   Care Checklist:  _No NSAIDs for postoperative pain because of kidney transplant.   _Anticoagulation plan with nephrologist whether he can stop ASA prior to surgery and how long he can remain off postop. Prior anticoagulation plan included being off ASA 7 days preop and 7 days postop. Update 9/21/23: Restarted ASA without complications.   _Should not take Lisinopril on the  day of any future surgeries because it makes his blood pressure harder to manage under anesthesia.   _Renal dosing for postoperative medications: Oxycodone 2.5mg q8hrs, Levofloxacin 750 mg IV/PO q 48 hours, Ancef (cefazolin) 2 gm q 24 hours, Keflex (cephalexin) 500 mg q 12 hours. If we consider surgery in the future, as nephrologist for adjusted antibiotic dosing.   _Needs anesthesia review before operating again in [facility] because his kidney function could change. Also, Dr. Ontiveros recommended: repeat BMP prior to scheduling as well as another BMP the day of surgery.     Background/Connection:   Preferred name = Jose D  What is most important to know about you as a person? (No medical information) Retired. . Planning on [place] time in [place] and [place].      Photographs: [facility] consents signed 4/20/23.    Ruiz Scott MD         Postoperative Reconstruction Follow-Up            HISTORY     Today I had the pleasure of seeing the patient at the Facial Plastic and Reconstructive Surgery Clinic in the Department of Otolaryngology at [facility]. He follows up after undergoing reconstruction.     How is your healing is going? Good. Progressing for sure.     Any concerns about the appearance of the surgery site? No.     Any functional impairment such as nasal congestion, difficulty closing eyelids, or hearing loss? No.     0/10: How would you rate your pain at this time?   No: Significant bleeding that has not stopped?  No: Discharge from your wounds?  No: Fevers?  No: Has your health changed since surgery?      How is wearing the stent? Uncomfortable.     Are you able to breathe through the breathing port(s) of your stent? Yes when it is cleaned out.    How many hours a day do you wear the stent? 24 hours a day because it was sewn in.    How do you clean or maintain your stent? Take out and wash in susy soap.     Nasal Obstruction Symptom Evaluation (NOSE QUESTIONNAIRE): WITH  the stent OUT.     0 - not a problem: Nasal congestion or stuffiness?  1 - a very mild problem: Nasal blockage or obstruction?  0 - not a problem: Trouble breathing through his nose?  1 - a very mild problem: Trouble sleeping? (Increases to a 2 with the stent IN because it gets snagged sometimes in bed with sleeping.   0 - not a problem: Inability to get enough air through his nose during exercise or exertion?    2 = Total NOSE score    PHYSICAL EXAM     Tissue viability at reconstruction site(s)-   100% of the reconstructed tissue appears viable with appropriate color and capillary refill.    Complications at reconstruction site(s)-  No: Bleeding  No: Evidence of hematoma  No: Evidence of infection  No: Wound more tender than expected   No: Wound dehiscence  No: Scar widening  No: Facial nerve weakness  No: Dryness and crusting of the skin  No: Intranasal crusting  No: Non viable portions of the intranasal skin graft  No: Satellite areas of redness (separate and distinct red spots beyond the usual redness at the edges of every reconstruction site.)   No: Other complications at the reconstruction site    Shape of the reconstructed nasal cavity: widely patent  Shape of the external nasal valve: widely patent    Function near the reconstruction site(s)-  No: Evidence of functional impairment near the reconstruction site such as nasal obstruction.    Donor site(s)-  No: Evidence of complication at the donor site  General: no apparent distress. Pleasant affect. Normal ability to communicate.   Respiratory: normal respiratory effort. No stridor. Voice strong.      Stent-  YES: Crusting of the stent  No: Malposition of the stent  No: Fracture or discoloration of the stent  No: Intranasal or external pressure sores from stent  Sutured in stent and opposite side septal sheeting was removed and new stent was replaced.     Ease of stent removal and replacement: easy.       SIGNATURE   Ruiz Scott MD            Again,  thank you for allowing me to participate in the care of your patient.        Sincerely,        Ruiz Scott MD

## 2023-09-28 NOTE — NURSING NOTE
Kel Hernandez's chief complaint for this visit includes:  Chief Complaint   Patient presents with    Surgical Followup     2 week stage 2 left nasal reconstruction with wound preparation, local flaps, ear cartilage graft from left ear, lateral nasal wall reconstruction, stent placement, DOS 9-14-23     PCP:     Referring Provider:  No referring provider defined for this encounter.    BP (!) 156/78   Pulse 71

## 2023-09-28 NOTE — PATIENT INSTRUCTIONS
_Nasal stent care: remove the stent one time per day. Wash the stent out until clean. Also wash out your nostril with nasal saline and then put vaseline into your nostril. You can leave the stent out for 30 minutes per day. Coat the stent with vaseline when you put it back into your nose. Wear the stent as much as possible for another 2 weeks. Two weeks from now, start increasing the time the stent is out 1 hour per day until reaching the goal of wearing the stent for 4 hours per day. Use the stent for 4 hours a day for a week. Then you can stop using the stent. Use a strap if you notice the stent being slowly pushed out or if the fit changes. If you have pain or can't get the stent in or if the fit changes, call Dr. Scott right away.

## 2023-12-21 ENCOUNTER — NURSE TRIAGE (OUTPATIENT)
Dept: NURSING | Facility: CLINIC | Age: 63
End: 2023-12-21
Payer: MEDICARE

## 2023-12-22 NOTE — TELEPHONE ENCOUNTER
Wife is the caller.  Pt is with pt.  Pt was in the hospital in [place] for CMV and are now in [place].  Wife/pt found out today that their sister tested positive for COVID and wife has questions on testing, if they should do home test or PCR.  Pt/wife will call [facility] team pt had before moving to [place] for guidance on home test vs PCR.  Destiny Louis RN  [facility] Nurse Advisor

## 2024-11-03 ENCOUNTER — HEALTH MAINTENANCE LETTER (OUTPATIENT)
Age: 64
End: 2024-11-03

## (undated) DEVICE — ESU HOLSTER PLASTIC DISP E2400

## (undated) DEVICE — GLOVE BIOGEL PI MICRO SZ 7.5 48575

## (undated) DEVICE — SU PROLENE 5-0 P-3 18" 8698G

## (undated) DEVICE — SU ETHILON 3-0 FS-1 18" 669H

## (undated) DEVICE — NDL 30GA 1" 305128

## (undated) DEVICE — SU VICRYL 4-0 P-3 18" UND  J494H

## (undated) DEVICE — SU PDS II 5-0 P-3 18" Z493G

## (undated) DEVICE — BLADE KNIFE SURG 15 371115

## (undated) DEVICE — PACK MINOR SBA15MIFSE

## (undated) DEVICE — DRSG TEGADERM 2 3/8X2 3/4" 1624W

## (undated) DEVICE — GLOVE BIOGEL PI MICRO SZ 7.0 48570

## (undated) DEVICE — DRAPE IOBAN INCISE 23X17" 6650EZ

## (undated) DEVICE — SU PROLENE 3-0 PS-2 18" 8687H

## (undated) DEVICE — DRSG TELFA 3X8" 1238

## (undated) DEVICE — SU VICRYL 5-0 P-3 18" UND J493G

## (undated) DEVICE — SU ETHILON 5-0 PS-2 18" 1666G

## (undated) DEVICE — SYR 10ML FINGER CONTROL W/O NDL 309695

## (undated) DEVICE — DRAPE SPLIT EENT 76X124" 3X28" 9447

## (undated) DEVICE — ESU GROUND PAD ADULT W/CORD E7507

## (undated) DEVICE — SU PLAIN FAST ABSORB 5-0 PC-1 18" 1915G

## (undated) DEVICE — PREP POVIDONE-IODINE 10% SOLUTION 4OZ BOTTLE MDS093944

## (undated) DEVICE — DRSG GAUZE 4X4" TRAY 6939

## (undated) DEVICE — SU VICRYL 3-0 RB-1 27" UND J215H

## (undated) DEVICE — SU SILK 3-0 FS-1 18" 684G

## (undated) DEVICE — SOL WATER IRRIG 1000ML BOTTLE 07139-09

## (undated) DEVICE — SU ETHILON 3-0 PS-2 18" 1669H

## (undated) DEVICE — SU CHROMIC 5-0 P-3 18" 687G

## (undated) DEVICE — PREP POVIDONE IODINE SWABS X3

## (undated) DEVICE — SHEET SILICONE 55MMX75MMX1.0MM  20-10680

## (undated) DEVICE — SU PROLENE 4-0 PS-2 18" 8682G

## (undated) DEVICE — SU PROLENE 3-0 SHDA 36" 8522H

## (undated) RX ORDER — CEFAZOLIN SODIUM 1 G/3ML
INJECTION, POWDER, FOR SOLUTION INTRAMUSCULAR; INTRAVENOUS
Status: DISPENSED
Start: 2023-05-04

## (undated) RX ORDER — PROPOFOL 10 MG/ML
INJECTION, EMULSION INTRAVENOUS
Status: DISPENSED
Start: 2023-09-14

## (undated) RX ORDER — LIDOCAINE HYDROCHLORIDE AND EPINEPHRINE 10; 10 MG/ML; UG/ML
INJECTION, SOLUTION INFILTRATION; PERINEURAL
Status: DISPENSED
Start: 2023-05-04

## (undated) RX ORDER — MUPIROCIN 20 MG/G
OINTMENT TOPICAL
Status: DISPENSED
Start: 2023-09-14

## (undated) RX ORDER — ONDANSETRON 2 MG/ML
INJECTION INTRAMUSCULAR; INTRAVENOUS
Status: DISPENSED
Start: 2023-05-04

## (undated) RX ORDER — DEXAMETHASONE SODIUM PHOSPHATE 4 MG/ML
INJECTION, SOLUTION INTRA-ARTICULAR; INTRALESIONAL; INTRAMUSCULAR; INTRAVENOUS; SOFT TISSUE
Status: DISPENSED
Start: 2023-09-14

## (undated) RX ORDER — VASOPRESSIN 20 U/ML
INJECTION PARENTERAL
Status: DISPENSED
Start: 2023-09-14

## (undated) RX ORDER — LIDOCAINE HYDROCHLORIDE AND EPINEPHRINE 10; 10 MG/ML; UG/ML
INJECTION, SOLUTION INFILTRATION; PERINEURAL
Status: DISPENSED
Start: 2023-09-14

## (undated) RX ORDER — PHENYLEPHRINE HYDROCHLORIDE 10 MG/ML
INJECTION INTRAVENOUS
Status: DISPENSED
Start: 2023-09-14

## (undated) RX ORDER — GLYCOPYRROLATE 0.2 MG/ML
INJECTION, SOLUTION INTRAMUSCULAR; INTRAVENOUS
Status: DISPENSED
Start: 2023-09-14

## (undated) RX ORDER — FENTANYL CITRATE 50 UG/ML
INJECTION, SOLUTION INTRAMUSCULAR; INTRAVENOUS
Status: DISPENSED
Start: 2023-09-14

## (undated) RX ORDER — ONDANSETRON 2 MG/ML
INJECTION INTRAMUSCULAR; INTRAVENOUS
Status: DISPENSED
Start: 2023-09-14

## (undated) RX ORDER — CEFAZOLIN SODIUM 2 G/50ML
SOLUTION INTRAVENOUS
Status: DISPENSED
Start: 2023-09-14

## (undated) RX ORDER — CEFAZOLIN SODIUM 1 G/3ML
INJECTION, POWDER, FOR SOLUTION INTRAMUSCULAR; INTRAVENOUS
Status: DISPENSED
Start: 2023-09-14

## (undated) RX ORDER — FENTANYL CITRATE 50 UG/ML
INJECTION, SOLUTION INTRAMUSCULAR; INTRAVENOUS
Status: DISPENSED
Start: 2023-05-04

## (undated) RX ORDER — FENTANYL CITRATE-0.9 % NACL/PF 10 MCG/ML
PLASTIC BAG, INJECTION (ML) INTRAVENOUS
Status: DISPENSED
Start: 2023-09-14

## (undated) RX ORDER — LIDOCAINE HYDROCHLORIDE 10 MG/ML
INJECTION, SOLUTION EPIDURAL; INFILTRATION; INTRACAUDAL; PERINEURAL
Status: DISPENSED
Start: 2023-09-14

## (undated) RX ORDER — ACETAMINOPHEN 325 MG/1
TABLET ORAL
Status: DISPENSED
Start: 2023-09-14

## (undated) RX ORDER — PHENYLEPHRINE HYDROCHLORIDE 10 MG/ML
INJECTION INTRAVENOUS
Status: DISPENSED
Start: 2023-05-04

## (undated) RX ORDER — EPINEPHRINE 1 MG/ML
INJECTION, SOLUTION INTRAMUSCULAR; SUBCUTANEOUS
Status: DISPENSED
Start: 2023-09-14

## (undated) RX ORDER — PROPOFOL 10 MG/ML
INJECTION, EMULSION INTRAVENOUS
Status: DISPENSED
Start: 2023-05-04

## (undated) RX ORDER — MUPIROCIN 20 MG/G
OINTMENT TOPICAL
Status: DISPENSED
Start: 2023-05-04

## (undated) RX ORDER — ACETAMINOPHEN 325 MG/1
TABLET ORAL
Status: DISPENSED
Start: 2023-05-04

## (undated) RX ORDER — GLYCOPYRROLATE 0.2 MG/ML
INJECTION, SOLUTION INTRAMUSCULAR; INTRAVENOUS
Status: DISPENSED
Start: 2023-05-04

## (undated) RX ORDER — EPHEDRINE SULFATE 50 MG/ML
INJECTION, SOLUTION INTRAMUSCULAR; INTRAVENOUS; SUBCUTANEOUS
Status: DISPENSED
Start: 2023-09-14